# Patient Record
Sex: MALE | Race: WHITE | NOT HISPANIC OR LATINO | Employment: OTHER | URBAN - METROPOLITAN AREA
[De-identification: names, ages, dates, MRNs, and addresses within clinical notes are randomized per-mention and may not be internally consistent; named-entity substitution may affect disease eponyms.]

---

## 2017-01-11 ENCOUNTER — AMBULATORY - HEALTHEAST (OUTPATIENT)
Dept: SURGERY | Facility: CLINIC | Age: 62
End: 2017-01-11

## 2017-01-11 ENCOUNTER — OFFICE VISIT - HEALTHEAST (OUTPATIENT)
Dept: SURGERY | Facility: CLINIC | Age: 62
End: 2017-01-11

## 2017-01-11 DIAGNOSIS — H61.92 SKIN LESION OF LEFT EAR: ICD-10-CM

## 2017-01-11 DIAGNOSIS — L72.9 SCALP CYST: ICD-10-CM

## 2017-01-11 ASSESSMENT — MIFFLIN-ST. JEOR: SCORE: 1760.15

## 2017-01-12 ENCOUNTER — RECORDS - HEALTHEAST (OUTPATIENT)
Dept: ADMINISTRATIVE | Facility: OTHER | Age: 62
End: 2017-01-12

## 2017-01-24 ENCOUNTER — AMBULATORY - HEALTHEAST (OUTPATIENT)
Dept: SURGERY | Facility: CLINIC | Age: 62
End: 2017-01-24

## 2017-01-24 ENCOUNTER — RECORDS - HEALTHEAST (OUTPATIENT)
Dept: ADMINISTRATIVE | Facility: OTHER | Age: 62
End: 2017-01-24

## 2017-02-03 ENCOUNTER — OFFICE VISIT - HEALTHEAST (OUTPATIENT)
Dept: SURGERY | Facility: CLINIC | Age: 62
End: 2017-02-03

## 2017-02-03 DIAGNOSIS — Z98.890 POST-OPERATIVE STATE: ICD-10-CM

## 2017-02-08 ENCOUNTER — COMMUNICATION - HEALTHEAST (OUTPATIENT)
Dept: SURGERY | Facility: CLINIC | Age: 62
End: 2017-02-08

## 2017-03-09 ENCOUNTER — RECORDS - HEALTHEAST (OUTPATIENT)
Dept: ADMINISTRATIVE | Facility: OTHER | Age: 62
End: 2017-03-09

## 2017-03-09 ENCOUNTER — AMBULATORY - HEALTHEAST (OUTPATIENT)
Dept: SURGERY | Facility: CLINIC | Age: 62
End: 2017-03-09

## 2017-03-23 ENCOUNTER — OFFICE VISIT - HEALTHEAST (OUTPATIENT)
Dept: SURGERY | Facility: CLINIC | Age: 62
End: 2017-03-23

## 2017-03-23 DIAGNOSIS — Z48.89 POSTOPERATIVE VISIT: ICD-10-CM

## 2017-11-22 ENCOUNTER — OFFICE VISIT - HEALTHEAST (OUTPATIENT)
Dept: FAMILY MEDICINE | Facility: CLINIC | Age: 62
End: 2017-11-22

## 2017-11-22 DIAGNOSIS — Z23 FLU VACCINE NEED: ICD-10-CM

## 2017-11-22 DIAGNOSIS — Z00.00 ROUTINE ADULT HEALTH MAINTENANCE: ICD-10-CM

## 2017-11-22 LAB
CHOLEST SERPL-MCNC: 185 MG/DL
FASTING STATUS PATIENT QL REPORTED: YES
HDLC SERPL-MCNC: 41 MG/DL
LDLC SERPL CALC-MCNC: 131 MG/DL
PSA SERPL-MCNC: 2.3 NG/ML (ref 0–4.5)
TRIGL SERPL-MCNC: 66 MG/DL

## 2017-11-22 ASSESSMENT — MIFFLIN-ST. JEOR: SCORE: 1714.79

## 2018-11-28 ENCOUNTER — AMBULATORY - HEALTHEAST (OUTPATIENT)
Dept: NURSING | Facility: CLINIC | Age: 63
End: 2018-11-28

## 2019-01-22 ENCOUNTER — RECORDS - HEALTHEAST (OUTPATIENT)
Dept: ADMINISTRATIVE | Facility: OTHER | Age: 64
End: 2019-01-22

## 2019-01-23 ENCOUNTER — RECORDS - HEALTHEAST (OUTPATIENT)
Dept: ADMINISTRATIVE | Facility: OTHER | Age: 64
End: 2019-01-23

## 2019-02-13 ENCOUNTER — OFFICE VISIT - HEALTHEAST (OUTPATIENT)
Dept: FAMILY MEDICINE | Facility: CLINIC | Age: 64
End: 2019-02-13

## 2019-02-13 DIAGNOSIS — R91.1 PULMONARY NODULE: ICD-10-CM

## 2019-02-13 DIAGNOSIS — Z00.00 HEALTH CARE MAINTENANCE: ICD-10-CM

## 2019-02-13 ASSESSMENT — MIFFLIN-ST. JEOR: SCORE: 1633.63

## 2019-02-21 ENCOUNTER — HOSPITAL ENCOUNTER (OUTPATIENT)
Dept: CT IMAGING | Facility: HOSPITAL | Age: 64
Discharge: HOME | End: 2019-02-21
Attending: FAMILY MEDICINE

## 2019-02-21 DIAGNOSIS — R91.1 PULMONARY NODULE: ICD-10-CM

## 2019-02-22 ENCOUNTER — COMMUNICATION - HEALTHEAST (OUTPATIENT)
Dept: FAMILY MEDICINE | Facility: CLINIC | Age: 64
End: 2019-02-22

## 2019-08-29 ENCOUNTER — OFFICE VISIT - HEALTHEAST (OUTPATIENT)
Dept: FAMILY MEDICINE | Facility: CLINIC | Age: 64
End: 2019-08-29

## 2019-08-29 DIAGNOSIS — Z00.00 HEALTH CARE MAINTENANCE: ICD-10-CM

## 2019-08-29 LAB
ALBUMIN SERPL-MCNC: 4.2 G/DL (ref 3.5–5)
ALP SERPL-CCNC: 66 U/L (ref 45–120)
ALT SERPL W P-5'-P-CCNC: 14 U/L (ref 0–45)
ANION GAP SERPL CALCULATED.3IONS-SCNC: 10 MMOL/L (ref 5–18)
AST SERPL W P-5'-P-CCNC: 15 U/L (ref 0–40)
BILIRUB SERPL-MCNC: 1 MG/DL (ref 0–1)
BUN SERPL-MCNC: 25 MG/DL (ref 8–22)
CALCIUM SERPL-MCNC: 9.4 MG/DL (ref 8.5–10.5)
CHLORIDE BLD-SCNC: 106 MMOL/L (ref 98–107)
CHOLEST SERPL-MCNC: 179 MG/DL
CO2 SERPL-SCNC: 25 MMOL/L (ref 22–31)
CREAT SERPL-MCNC: 0.89 MG/DL (ref 0.7–1.3)
ERYTHROCYTE [DISTWIDTH] IN BLOOD BY AUTOMATED COUNT: 11.9 % (ref 11–14.5)
FASTING STATUS PATIENT QL REPORTED: YES
GFR SERPL CREATININE-BSD FRML MDRD: >60 ML/MIN/1.73M2
GLUCOSE BLD-MCNC: 91 MG/DL (ref 70–125)
HCT VFR BLD AUTO: 43.1 % (ref 40–54)
HDLC SERPL-MCNC: 46 MG/DL
HGB BLD-MCNC: 14.7 G/DL (ref 14–18)
LDLC SERPL CALC-MCNC: 119 MG/DL
MCH RBC QN AUTO: 29.8 PG (ref 27–34)
MCHC RBC AUTO-ENTMCNC: 34 G/DL (ref 32–36)
MCV RBC AUTO: 88 FL (ref 80–100)
PLATELET # BLD AUTO: 168 THOU/UL (ref 140–440)
PMV BLD AUTO: 7.7 FL (ref 7–10)
POTASSIUM BLD-SCNC: 4.2 MMOL/L (ref 3.5–5)
PROT SERPL-MCNC: 6.9 G/DL (ref 6–8)
PSA SERPL-MCNC: 2.3 NG/ML (ref 0–4.5)
RBC # BLD AUTO: 4.91 MILL/UL (ref 4.4–6.2)
SODIUM SERPL-SCNC: 141 MMOL/L (ref 136–145)
TRIGL SERPL-MCNC: 68 MG/DL
WBC: 5.5 THOU/UL (ref 4–11)

## 2019-08-29 ASSESSMENT — MIFFLIN-ST. JEOR: SCORE: 1711.16

## 2019-10-25 ENCOUNTER — AMBULATORY - HEALTHEAST (OUTPATIENT)
Dept: NURSING | Facility: CLINIC | Age: 64
End: 2019-10-25

## 2019-10-25 ENCOUNTER — AMBULATORY - HEALTHEAST (OUTPATIENT)
Dept: FAMILY MEDICINE | Facility: CLINIC | Age: 64
End: 2019-10-25

## 2020-03-12 ENCOUNTER — AMBULATORY - HEALTHEAST (OUTPATIENT)
Dept: NURSING | Facility: CLINIC | Age: 65
End: 2020-03-12

## 2020-04-30 ENCOUNTER — OFFICE VISIT - HEALTHEAST (OUTPATIENT)
Dept: FAMILY MEDICINE | Facility: CLINIC | Age: 65
End: 2020-04-30

## 2020-04-30 ENCOUNTER — COMMUNICATION - HEALTHEAST (OUTPATIENT)
Dept: FAMILY MEDICINE | Facility: CLINIC | Age: 65
End: 2020-04-30

## 2020-04-30 DIAGNOSIS — R22.0 MASS OF POSTAURICULAR AREA: ICD-10-CM

## 2020-04-30 DIAGNOSIS — Z12.83 SCREENING EXAM FOR SKIN CANCER: ICD-10-CM

## 2020-05-01 ENCOUNTER — COMMUNICATION - HEALTHEAST (OUTPATIENT)
Dept: FAMILY MEDICINE | Facility: CLINIC | Age: 65
End: 2020-05-01

## 2020-05-05 ENCOUNTER — HOSPITAL ENCOUNTER (OUTPATIENT)
Dept: ULTRASOUND IMAGING | Facility: HOSPITAL | Age: 65
Discharge: HOME | End: 2020-05-05
Attending: FAMILY MEDICINE

## 2020-05-05 DIAGNOSIS — R22.0 MASS OF POSTAURICULAR AREA: ICD-10-CM

## 2020-05-06 ENCOUNTER — COMMUNICATION - HEALTHEAST (OUTPATIENT)
Dept: FAMILY MEDICINE | Facility: CLINIC | Age: 65
End: 2020-05-06

## 2020-05-06 ENCOUNTER — AMBULATORY - HEALTHEAST (OUTPATIENT)
Dept: FAMILY MEDICINE | Facility: CLINIC | Age: 65
End: 2020-05-06

## 2020-05-06 DIAGNOSIS — E21.4: ICD-10-CM

## 2020-05-07 ENCOUNTER — COMMUNICATION - HEALTHEAST (OUTPATIENT)
Dept: FAMILY MEDICINE | Facility: CLINIC | Age: 65
End: 2020-05-07

## 2020-05-12 ENCOUNTER — AMBULATORY - HEALTHEAST (OUTPATIENT)
Dept: LAB | Facility: CLINIC | Age: 65
End: 2020-05-12

## 2020-05-12 ENCOUNTER — HOSPITAL ENCOUNTER (OUTPATIENT)
Dept: ULTRASOUND IMAGING | Facility: CLINIC | Age: 65
Discharge: HOME | End: 2020-05-12
Attending: FAMILY MEDICINE

## 2020-05-12 ENCOUNTER — COMMUNICATION - HEALTHEAST (OUTPATIENT)
Dept: FAMILY MEDICINE | Facility: CLINIC | Age: 65
End: 2020-05-12

## 2020-05-12 DIAGNOSIS — K11.8 PAROTID MASS: ICD-10-CM

## 2020-05-12 DIAGNOSIS — E04.1 THYROID NODULE: ICD-10-CM

## 2020-05-12 DIAGNOSIS — E21.4: ICD-10-CM

## 2020-05-12 LAB
ALBUMIN SERPL-MCNC: 4 G/DL (ref 3.5–5)
ALP SERPL-CCNC: 62 U/L (ref 45–120)
ALT SERPL W P-5'-P-CCNC: 15 U/L (ref 0–45)
ANION GAP SERPL CALCULATED.3IONS-SCNC: 5 MMOL/L (ref 5–18)
AST SERPL W P-5'-P-CCNC: 17 U/L (ref 0–40)
BASOPHILS # BLD AUTO: 0 THOU/UL (ref 0–0.2)
BASOPHILS NFR BLD AUTO: 0 % (ref 0–2)
BILIRUB SERPL-MCNC: 0.6 MG/DL (ref 0–1)
BUN SERPL-MCNC: 22 MG/DL (ref 8–22)
CALCIUM SERPL-MCNC: 9.5 MG/DL (ref 8.5–10.5)
CALCIUM SERPL-MCNC: 9.5 MG/DL (ref 8.5–10.5)
CHLORIDE BLD-SCNC: 105 MMOL/L (ref 98–107)
CO2 SERPL-SCNC: 29 MMOL/L (ref 22–31)
CREAT SERPL-MCNC: 0.85 MG/DL (ref 0.7–1.3)
CREAT SERPL-MCNC: 0.86 MG/DL (ref 0.7–1.3)
EOSINOPHIL # BLD AUTO: 0.1 THOU/UL (ref 0–0.4)
EOSINOPHIL NFR BLD AUTO: 2 % (ref 0–6)
ERYTHROCYTE [DISTWIDTH] IN BLOOD BY AUTOMATED COUNT: 12.4 % (ref 11–14.5)
GFR SERPL CREATININE-BSD FRML MDRD: >60 ML/MIN/1.73M2
GFR SERPL CREATININE-BSD FRML MDRD: >60 ML/MIN/1.73M2
GLUCOSE BLD-MCNC: 94 MG/DL (ref 70–125)
HCT VFR BLD AUTO: 45.2 % (ref 40–54)
HGB BLD-MCNC: 14.4 G/DL (ref 14–18)
LYMPHOCYTES # BLD AUTO: 1.7 THOU/UL (ref 0.8–4.4)
LYMPHOCYTES NFR BLD AUTO: 31 % (ref 20–40)
MCH RBC QN AUTO: 28.8 PG (ref 27–34)
MCHC RBC AUTO-ENTMCNC: 31.9 G/DL (ref 32–36)
MCV RBC AUTO: 90 FL (ref 80–100)
MONOCYTES # BLD AUTO: 0.6 THOU/UL (ref 0–0.9)
MONOCYTES NFR BLD AUTO: 10 % (ref 2–10)
NEUTROPHILS # BLD AUTO: 3.1 THOU/UL (ref 2–7.7)
NEUTROPHILS NFR BLD AUTO: 56 % (ref 50–70)
PHOSPHATE SERPL-MCNC: 3.1 MG/DL (ref 2.5–4.5)
PLATELET # BLD AUTO: 192 THOU/UL (ref 140–440)
PMV BLD AUTO: 9.8 FL (ref 8.5–12.5)
POTASSIUM BLD-SCNC: 4.7 MMOL/L (ref 3.5–5)
PROT SERPL-MCNC: 7.2 G/DL (ref 6–8)
PTH-INTACT SERPL-MCNC: 70 PG/ML (ref 10–86)
RBC # BLD AUTO: 5 MILL/UL (ref 4.4–6.2)
SODIUM SERPL-SCNC: 139 MMOL/L (ref 136–145)
TSH SERPL DL<=0.005 MIU/L-ACNC: 0.82 UIU/ML (ref 0.3–5)
WBC: 5.6 THOU/UL (ref 4–11)

## 2020-05-14 LAB
LAB AP CHARGES (HE HISTORICAL CONVERSION): NORMAL
LAB AP CHARGES (HE HISTORICAL CONVERSION): NORMAL
PATH REPORT.COMMENTS IMP SPEC: NORMAL
PATH REPORT.FINAL DX SPEC: NORMAL
PATH REPORT.FINAL DX SPEC: NORMAL
PATH REPORT.MICROSCOPIC SPEC OTHER STN: NORMAL
PATH REPORT.MICROSCOPIC SPEC OTHER STN: NORMAL
RESULT FLAG (HE HISTORICAL CONVERSION): NORMAL
RESULT FLAG (HE HISTORICAL CONVERSION): NORMAL

## 2020-05-18 ENCOUNTER — COMMUNICATION - HEALTHEAST (OUTPATIENT)
Dept: FAMILY MEDICINE | Facility: CLINIC | Age: 65
End: 2020-05-18

## 2020-05-18 DIAGNOSIS — C85.91 NON-HODGKIN LYMPHOMA OF LYMPH NODES OF NECK, UNSPECIFIED NON-HODGKIN LYMPHOMA TYPE (H): ICD-10-CM

## 2020-05-19 ENCOUNTER — COMMUNICATION - HEALTHEAST (OUTPATIENT)
Dept: ONCOLOGY | Facility: CLINIC | Age: 65
End: 2020-05-19

## 2020-05-22 ENCOUNTER — AMBULATORY - HEALTHEAST (OUTPATIENT)
Dept: INFUSION THERAPY | Facility: HOSPITAL | Age: 65
End: 2020-05-22

## 2020-05-22 ENCOUNTER — OFFICE VISIT - HEALTHEAST (OUTPATIENT)
Dept: ONCOLOGY | Facility: HOSPITAL | Age: 65
End: 2020-05-22

## 2020-05-22 DIAGNOSIS — C85.91 NON-HODGKIN LYMPHOMA OF LYMPH NODES OF NECK, UNSPECIFIED NON-HODGKIN LYMPHOMA TYPE (H): ICD-10-CM

## 2020-05-22 LAB — LDH SERPL L TO P-CCNC: 156 U/L (ref 125–220)

## 2020-05-22 RX ORDER — IBUPROFEN 200 MG
200 TABLET ORAL EVERY 6 HOURS PRN
Status: SHIPPED | COMMUNITY
Start: 2020-05-22 | End: 2023-02-08

## 2020-05-22 RX ORDER — VIT C/HESPERIDIN/BIOFLAVONOIDS 500-100 MG
TABLET ORAL PRN
Status: SHIPPED | COMMUNITY
Start: 2020-05-22

## 2020-05-22 RX ORDER — CETIRIZINE HYDROCHLORIDE 10 MG/1
10 TABLET ORAL DAILY
Status: SHIPPED | COMMUNITY
Start: 2020-05-22 | End: 2021-11-12

## 2020-05-22 ASSESSMENT — MIFFLIN-ST. JEOR: SCORE: 1719.54

## 2020-05-28 ENCOUNTER — HOSPITAL ENCOUNTER (OUTPATIENT)
Dept: PET IMAGING | Facility: HOSPITAL | Age: 65
Setting detail: RADIATION/ONCOLOGY SERIES
Discharge: STILL ADMITTED OR ADMINISTRATIVE | End: 2020-05-28
Attending: INTERNAL MEDICINE

## 2020-05-28 DIAGNOSIS — C85.91 NON-HODGKIN LYMPHOMA OF LYMPH NODES OF NECK, UNSPECIFIED NON-HODGKIN LYMPHOMA TYPE (H): ICD-10-CM

## 2020-05-28 LAB — GLUCOSE BLDC GLUCOMTR-MCNC: 92 MG/DL (ref 70–139)

## 2020-05-28 ASSESSMENT — MIFFLIN-ST. JEOR: SCORE: 1721.59

## 2020-06-01 ENCOUNTER — OFFICE VISIT - HEALTHEAST (OUTPATIENT)
Dept: ONCOLOGY | Facility: HOSPITAL | Age: 65
End: 2020-06-01

## 2020-06-01 DIAGNOSIS — C83.31 DIFFUSE LARGE B-CELL LYMPHOMA OF LYMPH NODES OF NECK (H): ICD-10-CM

## 2020-06-02 ENCOUNTER — COMMUNICATION - HEALTHEAST (OUTPATIENT)
Dept: ONCOLOGY | Facility: HOSPITAL | Age: 65
End: 2020-06-02

## 2020-06-05 ENCOUNTER — OFFICE VISIT - HEALTHEAST (OUTPATIENT)
Dept: FAMILY MEDICINE | Facility: CLINIC | Age: 65
End: 2020-06-05

## 2020-06-05 DIAGNOSIS — M54.50 ACUTE LEFT-SIDED LOW BACK PAIN WITHOUT SCIATICA: ICD-10-CM

## 2020-06-05 LAB
CAP COMMENT: ABNORMAL
LAB AP CHARGES (HE HISTORICAL CONVERSION): ABNORMAL
LAB AP INITIAL CYTO EVAL (HE HISTORICAL CONVERSION): ABNORMAL
LAB MED GENERAL PATH INTERP (HE HISTORICAL CONVERSION): ABNORMAL
PATH REPORT.ADDENDUM SPEC: ABNORMAL
PATH REPORT.COMMENTS IMP SPEC: ABNORMAL
PATH REPORT.COMMENTS IMP SPEC: ABNORMAL
PATH REPORT.FINAL DX SPEC: ABNORMAL
PATH REPORT.MICROSCOPIC SPEC OTHER STN: ABNORMAL
PATH REPORT.MICROSCOPIC SPEC OTHER STN: ABNORMAL
PATH REPORT.RELEVANT HX SPEC: ABNORMAL
SPECIMEN DESCRIPTION: ABNORMAL

## 2020-06-05 NOTE — ASSESSMENT & PLAN NOTE
Acute low back pain of approximately 4 weeks of duration.  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  I suspect that this is actually a muscular injury.  Pain is increasing with time suggesting he does need some therapy.  He has not tried a conservative approach to therapy yet.  - Patient will take 4 and 40 mg of naproxen twice daily x14 days  - We discussed home physical therapy.  I also referred him to formal physical therapy.  - Flexeril as needed  -If not improving in 3-4 weeks I would refer to the spine care clinic urgently.  At that point, the sense of urgency would be based on the eminent chemotherapy.

## 2020-06-08 ENCOUNTER — COMMUNICATION - HEALTHEAST (OUTPATIENT)
Dept: ONCOLOGY | Facility: HOSPITAL | Age: 65
End: 2020-06-08

## 2020-06-09 ENCOUNTER — COMMUNICATION - HEALTHEAST (OUTPATIENT)
Dept: ONCOLOGY | Facility: HOSPITAL | Age: 65
End: 2020-06-09

## 2020-06-16 ENCOUNTER — OFFICE VISIT - HEALTHEAST (OUTPATIENT)
Dept: PHYSICAL THERAPY | Facility: REHABILITATION | Age: 65
End: 2020-06-16

## 2020-06-16 DIAGNOSIS — M54.50 LEFT-SIDED LOW BACK PAIN WITHOUT SCIATICA, UNSPECIFIED CHRONICITY: ICD-10-CM

## 2020-06-19 ENCOUNTER — AMBULATORY - HEALTHEAST (OUTPATIENT)
Dept: ONCOLOGY | Facility: HOSPITAL | Age: 65
End: 2020-06-19

## 2020-06-19 ENCOUNTER — OFFICE VISIT - HEALTHEAST (OUTPATIENT)
Dept: PHYSICAL THERAPY | Facility: REHABILITATION | Age: 65
End: 2020-06-19

## 2020-06-19 DIAGNOSIS — M54.50 LEFT-SIDED LOW BACK PAIN WITHOUT SCIATICA, UNSPECIFIED CHRONICITY: ICD-10-CM

## 2020-06-19 DIAGNOSIS — C82.01 GRADE 1 FOLLICULAR LYMPHOMA OF LYMPH NODES OF NECK (H): ICD-10-CM

## 2020-06-23 ENCOUNTER — OFFICE VISIT - HEALTHEAST (OUTPATIENT)
Dept: PHYSICAL THERAPY | Facility: REHABILITATION | Age: 65
End: 2020-06-23

## 2020-06-23 DIAGNOSIS — M54.50 LEFT-SIDED LOW BACK PAIN WITHOUT SCIATICA, UNSPECIFIED CHRONICITY: ICD-10-CM

## 2020-06-26 ENCOUNTER — OFFICE VISIT - HEALTHEAST (OUTPATIENT)
Dept: PHYSICAL THERAPY | Facility: REHABILITATION | Age: 65
End: 2020-06-26

## 2020-06-26 DIAGNOSIS — M54.50 LEFT-SIDED LOW BACK PAIN WITHOUT SCIATICA, UNSPECIFIED CHRONICITY: ICD-10-CM

## 2020-07-08 ENCOUNTER — OFFICE VISIT - HEALTHEAST (OUTPATIENT)
Dept: OTOLARYNGOLOGY | Facility: CLINIC | Age: 65
End: 2020-07-08

## 2020-07-08 ENCOUNTER — OFFICE VISIT - HEALTHEAST (OUTPATIENT)
Dept: PHYSICAL THERAPY | Facility: REHABILITATION | Age: 65
End: 2020-07-08

## 2020-07-08 DIAGNOSIS — C85.91: ICD-10-CM

## 2020-07-08 DIAGNOSIS — M54.50 LEFT-SIDED LOW BACK PAIN WITHOUT SCIATICA, UNSPECIFIED CHRONICITY: ICD-10-CM

## 2020-07-09 ENCOUNTER — COMMUNICATION - HEALTHEAST (OUTPATIENT)
Dept: OTOLARYNGOLOGY | Facility: CLINIC | Age: 65
End: 2020-07-09

## 2020-07-09 ENCOUNTER — COMMUNICATION - HEALTHEAST (OUTPATIENT)
Dept: FAMILY MEDICINE | Facility: CLINIC | Age: 65
End: 2020-07-09

## 2020-07-09 ENCOUNTER — AMBULATORY - HEALTHEAST (OUTPATIENT)
Dept: OTOLARYNGOLOGY | Facility: CLINIC | Age: 65
End: 2020-07-09

## 2020-07-09 DIAGNOSIS — Z11.59 ENCOUNTER FOR SCREENING FOR OTHER VIRAL DISEASES: ICD-10-CM

## 2020-07-15 ENCOUNTER — OFFICE VISIT - HEALTHEAST (OUTPATIENT)
Dept: FAMILY MEDICINE | Facility: CLINIC | Age: 65
End: 2020-07-15

## 2020-07-15 DIAGNOSIS — Z01.818 PREOP GENERAL PHYSICAL EXAM: ICD-10-CM

## 2020-07-15 DIAGNOSIS — C85.91: ICD-10-CM

## 2020-07-15 ASSESSMENT — MIFFLIN-ST. JEOR: SCORE: 1720.68

## 2020-07-20 ENCOUNTER — RECORDS - HEALTHEAST (OUTPATIENT)
Dept: ADMINISTRATIVE | Facility: OTHER | Age: 65
End: 2020-07-20

## 2020-07-24 ENCOUNTER — AMBULATORY - HEALTHEAST (OUTPATIENT)
Dept: FAMILY MEDICINE | Facility: CLINIC | Age: 65
End: 2020-07-24

## 2020-07-24 DIAGNOSIS — Z11.59 ENCOUNTER FOR SCREENING FOR OTHER VIRAL DISEASES: ICD-10-CM

## 2020-07-27 ENCOUNTER — ANESTHESIA - HEALTHEAST (OUTPATIENT)
Dept: SURGERY | Facility: AMBULATORY SURGERY CENTER | Age: 65
End: 2020-07-27

## 2020-07-27 ENCOUNTER — COMMUNICATION - HEALTHEAST (OUTPATIENT)
Dept: OTOLARYNGOLOGY | Facility: CLINIC | Age: 65
End: 2020-07-27

## 2020-07-27 ASSESSMENT — MIFFLIN-ST. JEOR
SCORE: 1712.51
SCORE: 1712.51

## 2020-07-28 ENCOUNTER — SURGERY - HEALTHEAST (OUTPATIENT)
Dept: SURGERY | Facility: AMBULATORY SURGERY CENTER | Age: 65
End: 2020-07-28

## 2020-07-28 ENCOUNTER — RECORDS - HEALTHEAST (OUTPATIENT)
Dept: ADMINISTRATIVE | Facility: OTHER | Age: 65
End: 2020-07-28

## 2020-07-28 ENCOUNTER — HOSPITAL ENCOUNTER (OUTPATIENT)
Dept: SURGERY | Facility: AMBULATORY SURGERY CENTER | Age: 65
Discharge: HOME | End: 2020-07-28
Attending: OTOLARYNGOLOGY | Admitting: OTOLARYNGOLOGY
Payer: MEDICARE

## 2020-07-28 DIAGNOSIS — C85.91: ICD-10-CM

## 2020-07-28 ASSESSMENT — MIFFLIN-ST. JEOR
SCORE: 1712.51
SCORE: 1712.51

## 2020-08-03 ENCOUNTER — COMMUNICATION - HEALTHEAST (OUTPATIENT)
Dept: ONCOLOGY | Facility: HOSPITAL | Age: 65
End: 2020-08-03

## 2020-08-04 ENCOUNTER — RECORDS - HEALTHEAST (OUTPATIENT)
Dept: ADMINISTRATIVE | Facility: OTHER | Age: 65
End: 2020-08-04

## 2020-08-04 LAB
LAB AP CHARGES (HE HISTORICAL CONVERSION): ABNORMAL
PATH REPORT.COMMENTS IMP SPEC: ABNORMAL
PATH REPORT.COMMENTS IMP SPEC: ABNORMAL
PATH REPORT.FINAL DX SPEC: ABNORMAL
PATH REPORT.MICROSCOPIC SPEC OTHER STN: ABNORMAL
RESULT FLAG (HE HISTORICAL CONVERSION): ABNORMAL

## 2020-08-06 ENCOUNTER — COMMUNICATION - HEALTHEAST (OUTPATIENT)
Dept: OTOLARYNGOLOGY | Facility: CLINIC | Age: 65
End: 2020-08-06

## 2020-08-07 ENCOUNTER — OFFICE VISIT - HEALTHEAST (OUTPATIENT)
Dept: ONCOLOGY | Facility: HOSPITAL | Age: 65
End: 2020-08-07

## 2020-08-07 DIAGNOSIS — C82.31 GRADE 3A FOLLICULAR LYMPHOMA OF LYMPH NODES OF NECK (H): ICD-10-CM

## 2020-08-07 DIAGNOSIS — C88.40 EXTRANODAL MARGINAL ZONE LYMPHOMA OF MUCOSA-ASSOCIATED LYMPHOID TISSUE OF STOMACH: ICD-10-CM

## 2020-08-07 DIAGNOSIS — C85.91: ICD-10-CM

## 2020-08-10 ENCOUNTER — AMBULATORY - HEALTHEAST (OUTPATIENT)
Dept: LAB | Facility: HOSPITAL | Age: 65
End: 2020-08-10

## 2020-08-10 ENCOUNTER — AMBULATORY - HEALTHEAST (OUTPATIENT)
Dept: ONCOLOGY | Facility: HOSPITAL | Age: 65
End: 2020-08-10

## 2020-08-10 ENCOUNTER — INFUSION - HEALTHEAST (OUTPATIENT)
Dept: INFUSION THERAPY | Facility: HOSPITAL | Age: 65
End: 2020-08-10

## 2020-08-10 DIAGNOSIS — C85.91: ICD-10-CM

## 2020-08-10 LAB
BASOPHILS # BLD AUTO: 0 THOU/UL (ref 0–0.2)
BASOPHILS NFR BLD AUTO: 0 % (ref 0–2)
EOSINOPHIL # BLD AUTO: 0.1 THOU/UL (ref 0–0.4)
EOSINOPHIL NFR BLD AUTO: 2 % (ref 0–6)
ERYTHROCYTE [DISTWIDTH] IN BLOOD BY AUTOMATED COUNT: 12.7 % (ref 11–14.5)
HCT VFR BLD AUTO: 42.3 % (ref 40–54)
HGB BLD-MCNC: 13.7 G/DL (ref 14–18)
LYMPHOCYTES # BLD AUTO: 1.5 THOU/UL (ref 0.8–4.4)
LYMPHOCYTES NFR BLD AUTO: 28 % (ref 20–40)
MCH RBC QN AUTO: 29 PG (ref 27–34)
MCHC RBC AUTO-ENTMCNC: 32.4 G/DL (ref 32–36)
MCV RBC AUTO: 90 FL (ref 80–100)
MONOCYTES # BLD AUTO: 0.6 THOU/UL (ref 0–0.9)
MONOCYTES NFR BLD AUTO: 11 % (ref 2–10)
NEUTROPHILS # BLD AUTO: 3.2 THOU/UL (ref 2–7.7)
NEUTROPHILS NFR BLD AUTO: 59 % (ref 50–70)
PLATELET # BLD AUTO: 184 THOU/UL (ref 140–440)
PMV BLD AUTO: 9.9 FL (ref 8.5–12.5)
RBC # BLD AUTO: 4.72 MILL/UL (ref 4.4–6.2)
WBC: 5.5 THOU/UL (ref 4–11)

## 2020-08-12 LAB
LAB AP CHARGES (HE HISTORICAL CONVERSION): NORMAL
PATH REPORT.COMMENTS IMP SPEC: NORMAL
PATH REPORT.FINAL DX SPEC: NORMAL
PATH REPORT.MICROSCOPIC SPEC OTHER STN: NORMAL
RESULT FLAG (HE HISTORICAL CONVERSION): NORMAL

## 2020-08-13 ENCOUNTER — HOSPITAL ENCOUNTER (OUTPATIENT)
Dept: PET IMAGING | Facility: HOSPITAL | Age: 65
Setting detail: RADIATION/ONCOLOGY SERIES
Discharge: STILL ADMITTED OR ADMINISTRATIVE | End: 2020-08-13
Attending: INTERNAL MEDICINE

## 2020-08-13 DIAGNOSIS — C85.91: ICD-10-CM

## 2020-08-13 LAB — GLUCOSE BLDC GLUCOMTR-MCNC: 93 MG/DL (ref 70–139)

## 2020-08-13 ASSESSMENT — MIFFLIN-ST. JEOR: SCORE: 1717.05

## 2020-08-17 ENCOUNTER — OFFICE VISIT - HEALTHEAST (OUTPATIENT)
Dept: ONCOLOGY | Facility: HOSPITAL | Age: 65
End: 2020-08-17

## 2020-08-17 ENCOUNTER — RECORDS - HEALTHEAST (OUTPATIENT)
Dept: ADMINISTRATIVE | Facility: OTHER | Age: 65
End: 2020-08-17

## 2020-08-17 DIAGNOSIS — C85.91: ICD-10-CM

## 2020-08-19 LAB
BKR LAB AP CORE BIOPSY/ASPIRATE CLOT: NORMAL
LAB AP ASPIRATE SMEAR (HE HISTORICAL CONVERSION): NORMAL
LAB AP BONE MARROW DIFF (HE HISTORICAL CONVERSION): NORMAL
LAB AP CHARGES (HE HISTORICAL CONVERSION): NORMAL
PATH REPORT.COMMENTS IMP SPEC: NORMAL
PATH REPORT.FINAL DX SPEC: NORMAL
PATH REPORT.MICROSCOPIC SPEC OTHER STN: NORMAL
PATH REPORT.MICROSCOPIC SPEC OTHER STN: NORMAL
PATH REPORT.RELEVANT HX SPEC: NORMAL
RESULT FLAG (HE HISTORICAL CONVERSION): NORMAL

## 2020-08-20 ENCOUNTER — OFFICE VISIT - HEALTHEAST (OUTPATIENT)
Dept: RADIATION ONCOLOGY | Facility: HOSPITAL | Age: 65
End: 2020-08-20

## 2020-08-20 ENCOUNTER — AMBULATORY - HEALTHEAST (OUTPATIENT)
Dept: RADIATION ONCOLOGY | Facility: HOSPITAL | Age: 65
End: 2020-08-20

## 2020-08-20 DIAGNOSIS — C85.91: ICD-10-CM

## 2020-08-21 ENCOUNTER — AMBULATORY - HEALTHEAST (OUTPATIENT)
Dept: RADIATION ONCOLOGY | Facility: HOSPITAL | Age: 65
End: 2020-08-21

## 2020-08-21 DIAGNOSIS — C85.91: ICD-10-CM

## 2020-08-25 ENCOUNTER — AMBULATORY - HEALTHEAST (OUTPATIENT)
Dept: ONCOLOGY | Facility: HOSPITAL | Age: 65
End: 2020-08-25

## 2020-08-25 LAB — SPECIMEN STATUS: NORMAL

## 2020-08-26 ENCOUNTER — AMBULATORY - HEALTHEAST (OUTPATIENT)
Dept: RADIATION ONCOLOGY | Facility: HOSPITAL | Age: 65
End: 2020-08-26

## 2020-08-28 ENCOUNTER — AMBULATORY - HEALTHEAST (OUTPATIENT)
Dept: LAB | Facility: HOSPITAL | Age: 65
End: 2020-08-28

## 2020-08-31 ENCOUNTER — AMBULATORY - HEALTHEAST (OUTPATIENT)
Dept: FAMILY MEDICINE | Facility: CLINIC | Age: 65
End: 2020-08-31

## 2020-08-31 DIAGNOSIS — C85.91: ICD-10-CM

## 2020-09-02 ENCOUNTER — COMMUNICATION - HEALTHEAST (OUTPATIENT)
Dept: SCHEDULING | Facility: CLINIC | Age: 65
End: 2020-09-02

## 2020-09-03 ENCOUNTER — AMBULATORY - HEALTHEAST (OUTPATIENT)
Dept: RADIATION ONCOLOGY | Facility: HOSPITAL | Age: 65
End: 2020-09-03

## 2020-09-10 ENCOUNTER — COMMUNICATION - HEALTHEAST (OUTPATIENT)
Dept: ONCOLOGY | Facility: HOSPITAL | Age: 65
End: 2020-09-10

## 2020-09-10 ENCOUNTER — RECORDS - HEALTHEAST (OUTPATIENT)
Dept: ADMINISTRATIVE | Facility: OTHER | Age: 65
End: 2020-09-10

## 2020-09-24 ENCOUNTER — COMMUNICATION - HEALTHEAST (OUTPATIENT)
Dept: ONCOLOGY | Facility: HOSPITAL | Age: 65
End: 2020-09-24

## 2020-09-30 ENCOUNTER — RECORDS - HEALTHEAST (OUTPATIENT)
Dept: ADMINISTRATIVE | Facility: OTHER | Age: 65
End: 2020-09-30

## 2020-10-02 ENCOUNTER — AMBULATORY - HEALTHEAST (OUTPATIENT)
Dept: ONCOLOGY | Facility: HOSPITAL | Age: 65
End: 2020-10-02

## 2020-10-13 ENCOUNTER — AMBULATORY - HEALTHEAST (OUTPATIENT)
Dept: ONCOLOGY | Facility: HOSPITAL | Age: 65
End: 2020-10-13

## 2020-10-22 ENCOUNTER — COMMUNICATION - HEALTHEAST (OUTPATIENT)
Dept: ONCOLOGY | Facility: HOSPITAL | Age: 65
End: 2020-10-22

## 2020-10-24 ENCOUNTER — AMBULATORY - HEALTHEAST (OUTPATIENT)
Dept: NURSING | Facility: CLINIC | Age: 65
End: 2020-10-24

## 2020-12-03 ENCOUNTER — OFFICE VISIT - HEALTHEAST (OUTPATIENT)
Dept: ONCOLOGY | Facility: HOSPITAL | Age: 65
End: 2020-12-03

## 2020-12-03 DIAGNOSIS — C85.91: ICD-10-CM

## 2020-12-18 ENCOUNTER — OFFICE VISIT - HEALTHEAST (OUTPATIENT)
Dept: FAMILY MEDICINE | Facility: CLINIC | Age: 65
End: 2020-12-18

## 2020-12-18 DIAGNOSIS — C85.91: ICD-10-CM

## 2020-12-18 DIAGNOSIS — C43.9 MELANOMA OF SKIN (H): ICD-10-CM

## 2020-12-18 DIAGNOSIS — Z00.00 ROUTINE HISTORY AND PHYSICAL EXAMINATION OF ADULT: ICD-10-CM

## 2020-12-18 LAB
ALBUMIN SERPL-MCNC: 4.3 G/DL (ref 3.5–5)
ALP SERPL-CCNC: 67 U/L (ref 45–120)
ALT SERPL W P-5'-P-CCNC: 12 U/L (ref 0–45)
ANION GAP SERPL CALCULATED.3IONS-SCNC: 11 MMOL/L (ref 5–18)
AST SERPL W P-5'-P-CCNC: 13 U/L (ref 0–40)
BILIRUB SERPL-MCNC: 1 MG/DL (ref 0–1)
BUN SERPL-MCNC: 26 MG/DL (ref 8–22)
CALCIUM SERPL-MCNC: 9.3 MG/DL (ref 8.5–10.5)
CHLORIDE BLD-SCNC: 106 MMOL/L (ref 98–107)
CHOLEST SERPL-MCNC: 192 MG/DL
CO2 SERPL-SCNC: 25 MMOL/L (ref 22–31)
CREAT SERPL-MCNC: 0.81 MG/DL (ref 0.7–1.3)
ERYTHROCYTE [DISTWIDTH] IN BLOOD BY AUTOMATED COUNT: 12.2 % (ref 11–14.5)
FASTING STATUS PATIENT QL REPORTED: YES
GFR SERPL CREATININE-BSD FRML MDRD: >60 ML/MIN/1.73M2
GLUCOSE BLD-MCNC: 93 MG/DL (ref 70–125)
HCT VFR BLD AUTO: 42.9 % (ref 40–54)
HDLC SERPL-MCNC: 46 MG/DL
HGB BLD-MCNC: 14.7 G/DL (ref 14–18)
LDLC SERPL CALC-MCNC: 128 MG/DL
MCH RBC QN AUTO: 29.7 PG (ref 27–34)
MCHC RBC AUTO-ENTMCNC: 34.2 G/DL (ref 32–36)
MCV RBC AUTO: 87 FL (ref 80–100)
PLATELET # BLD AUTO: 165 THOU/UL (ref 140–440)
PMV BLD AUTO: 7.4 FL (ref 7–10)
POTASSIUM BLD-SCNC: 4.4 MMOL/L (ref 3.5–5)
PROT SERPL-MCNC: 6.9 G/DL (ref 6–8)
PSA SERPL-MCNC: 3.4 NG/ML (ref 0–4.5)
RBC # BLD AUTO: 4.95 MILL/UL (ref 4.4–6.2)
SODIUM SERPL-SCNC: 142 MMOL/L (ref 136–145)
TRIGL SERPL-MCNC: 89 MG/DL
WBC: 5 THOU/UL (ref 4–11)

## 2020-12-18 ASSESSMENT — MIFFLIN-ST. JEOR: SCORE: 1707.19

## 2020-12-24 ENCOUNTER — AMBULATORY - HEALTHEAST (OUTPATIENT)
Dept: FAMILY MEDICINE | Facility: CLINIC | Age: 65
End: 2020-12-24

## 2021-01-21 ENCOUNTER — COMMUNICATION - HEALTHEAST (OUTPATIENT)
Dept: ONCOLOGY | Facility: HOSPITAL | Age: 66
End: 2021-01-21

## 2021-01-22 ENCOUNTER — COMMUNICATION - HEALTHEAST (OUTPATIENT)
Dept: FAMILY MEDICINE | Facility: CLINIC | Age: 66
End: 2021-01-22

## 2021-01-22 ENCOUNTER — AMBULATORY - HEALTHEAST (OUTPATIENT)
Dept: FAMILY MEDICINE | Facility: CLINIC | Age: 66
End: 2021-01-22

## 2021-01-22 DIAGNOSIS — Z12.5 SCREENING FOR PROSTATE CANCER: ICD-10-CM

## 2021-01-27 ENCOUNTER — AMBULATORY - HEALTHEAST (OUTPATIENT)
Dept: LAB | Facility: CLINIC | Age: 66
End: 2021-01-27

## 2021-01-27 DIAGNOSIS — Z12.5 SCREENING FOR PROSTATE CANCER: ICD-10-CM

## 2021-01-27 LAB — PSA SERPL-MCNC: 3.7 NG/ML (ref 0–4.5)

## 2021-01-28 ENCOUNTER — COMMUNICATION - HEALTHEAST (OUTPATIENT)
Dept: FAMILY MEDICINE | Facility: CLINIC | Age: 66
End: 2021-01-28

## 2021-01-28 DIAGNOSIS — Z12.5 SCREENING FOR PROSTATE CANCER: ICD-10-CM

## 2021-03-01 ENCOUNTER — COMMUNICATION - HEALTHEAST (OUTPATIENT)
Dept: FAMILY MEDICINE | Facility: CLINIC | Age: 66
End: 2021-03-01

## 2021-03-02 ENCOUNTER — OFFICE VISIT - HEALTHEAST (OUTPATIENT)
Dept: ONCOLOGY | Facility: HOSPITAL | Age: 66
End: 2021-03-02

## 2021-03-02 ENCOUNTER — AMBULATORY - HEALTHEAST (OUTPATIENT)
Dept: LAB | Facility: CLINIC | Age: 66
End: 2021-03-02

## 2021-03-02 DIAGNOSIS — Z12.5 SCREENING FOR PROSTATE CANCER: ICD-10-CM

## 2021-03-02 DIAGNOSIS — K11.8 PAROTID MASS: ICD-10-CM

## 2021-03-02 LAB — PSA SERPL-MCNC: 2.7 NG/ML (ref 0–4.5)

## 2021-04-05 ENCOUNTER — AMBULATORY - HEALTHEAST (OUTPATIENT)
Dept: NURSING | Facility: CLINIC | Age: 66
End: 2021-04-05

## 2021-04-26 ENCOUNTER — AMBULATORY - HEALTHEAST (OUTPATIENT)
Dept: NURSING | Facility: CLINIC | Age: 66
End: 2021-04-26

## 2021-05-24 ENCOUNTER — RECORDS - HEALTHEAST (OUTPATIENT)
Dept: ADMINISTRATIVE | Facility: CLINIC | Age: 66
End: 2021-05-24

## 2021-05-27 VITALS
OXYGEN SATURATION: 96 % | DIASTOLIC BLOOD PRESSURE: 76 MMHG | SYSTOLIC BLOOD PRESSURE: 125 MMHG | HEART RATE: 63 BPM | TEMPERATURE: 98.3 F | RESPIRATION RATE: 16 BRPM

## 2021-05-27 VITALS
OXYGEN SATURATION: 98 % | HEART RATE: 70 BPM | DIASTOLIC BLOOD PRESSURE: 72 MMHG | TEMPERATURE: 98.2 F | SYSTOLIC BLOOD PRESSURE: 119 MMHG

## 2021-05-30 VITALS — WEIGHT: 209 LBS | HEIGHT: 71 IN | BODY MASS INDEX: 29.26 KG/M2

## 2021-05-31 VITALS — BODY MASS INDEX: 27.86 KG/M2 | WEIGHT: 199 LBS | HEIGHT: 71 IN

## 2021-05-31 NOTE — PROGRESS NOTES
Assessment:      Healthy male exam.    [facility]  Plan:       All questions answered.    GMX-qi-[id number]-date with immunizations, up-to-date with colon cancer screening, will obtain fasting blood work today and follow-up based on results  Subjective:      Dejan Zarco is a 64 y.o. male who presents for an annual exam. The patient reports that there is not domestic violence in his life.  Patient is here for annual exam.  He has no acute concerns.  He is up-to-date on healthcare maintenance.  Patient exercises on a regular basis in the form of biking during the summer and cross-country skiing in the winter.  He is retired .  He has been spending time reading and enjoying his time off with exercising.  He has noted some more joint aches after exercising but nothing of large concern.    Healthy Habits:   Regular Exercise: Yes  Sunscreen Use: Yes  Healthy Diet: Yes  Dental Visits Regularly: Yes  Seat Belt: Yes  Sexually active: Yes  Colonoscopy: Yes  Lipid Profile: Yes  Glucose Screen: Yes        Immunization History   Administered Date(s) Administered     INFLUENZA,RECOMBINANT,INJ,PF QUADRIVALENT 18+YRS 11/28/2018     Influenza, inj, historic,unspecified 10/20/2016     Influenza,seasonal,quad inj =/> 6months 10/20/2016, 11/22/2017     Td, adult adsorbed, PF 02/13/2019     ZOSTER, LIVE 10/20/2016     Immunization status: up to date and documented.    No exam data present    Current Outpatient Medications   Medication Sig Dispense Refill     aspirin 81 MG EC tablet Take 81 mg by mouth daily.       UNABLE TO FIND Med Name:Allergy medications over the counter       No current facility-administered medications for this visit.      No past medical history on file.  Past Surgical History:   Procedure Laterality Date     CYST REMOVAL      Scalp     Patient has no known allergies.  Family History   Problem Relation Age of Onset     Ovarian cancer Mother      Heart disease Father      Stroke Father      No  Medical Problems Sister      Heart disease Brother      Diabetes Brother      No Medical Problems Brother      Social History     Socioeconomic History     Marital status:      Spouse name: Not on file     Number of children: Not on file     Years of education: Not on file     Highest education level: Not on file   Occupational History     Not on file   Social Needs     Financial resource strain: Not on file     Food insecurity:     Worry: Not on file     Inability: Not on file     Transportation needs:     Medical: Not on file     Non-medical: Not on file   Tobacco Use     Smoking status: Never Smoker     Smokeless tobacco: Never Used   Substance and Sexual Activity     Alcohol use: Yes     Drug use: No     Sexual activity: Not on file   Lifestyle     Physical activity:     Days per week: Not on file     Minutes per session: Not on file     Stress: Not on file   Relationships     Social connections:     Talks on phone: Not on file     Gets together: Not on file     Attends Yarsani service: Not on file     Active member of club or organization: Not on file     Attends meetings of clubs or organizations: Not on file     Relationship status: Not on file     Intimate partner violence:     Fear of current or ex partner: Not on file     Emotionally abused: Not on file     Physically abused: Not on file     Forced sexual activity: Not on file   Other Topics Concern     Not on file   Social History Narrative     Not on file       Review of Systems  General:  Denies problem  Eyes: Denies problem  Ears/Nose/Throat: Denies problem  Cardiovascular: Denies problem  Respiratory:  Denies problem  Gastrointestinal:  Denies problem  Genitourinary: Denies problem  Musculoskeletal:  Denies problem  Skin: Denies problem  Neurologic: Denies problem  Psychiatric: Denies problem  Endocrine: Denies problem  Heme/Lymphatic: Denies problem   Allergic/Immunologic: Denies problem        Objective:     Vitals:    08/29/19 0753   BP:  "129/72   Pulse: 69   Resp: 16   Temp: (!) 95.4  F (35.2  C)   TempSrc: Oral   Weight: 198 lb 3.2 oz (89.9 kg)   Height: 5' 11\" (1.803 m)     Body mass index is 27.64 kg/m .    Physical  General Appearance: Alert, cooperative, no distress, appears stated age  Head: Normocephalic, without obvious abnormality, atraumatic  Eyes: PERRL, conjunctiva/corneas clear, EOM's intact  Ears: Normal TM's and external ear canals, both ears  Nose: Nares normal, septum midline,mucosa normal, no drainage  Throat: Lips, mucosa, and tongue normal; teeth and gums normal  Neck: Supple, symmetrical, trachea midline, no adenopathy;  thyroid: not enlarged, symmetric, no tenderness/mass/nodules; no carotid bruit or JVD  Back: Symmetric, no curvature, ROM normal, no CVA tenderness  Lungs: Clear to auscultation bilaterally, respirations unlabored  Heart: Regular rate and rhythm, S1 and S2 normal, no murmur, rub, or gallop,  Abdomen: Soft, non-tender, bowel sounds active all four quadrants,  no masses, no organomegaly  Musculoskeletal: Normal range of motion. No joint swelling or deformity.   Extremities: Extremities normal, atraumatic, no cyanosis or edema  Skin: Skin color, texture, turgor normal, no rashes or lesions, multiple cherry hemangiomas and seb keratosis  Lymph nodes: Cervical, supraclavicular, and axillary nodes normal  Neurologic: He is alert. He has normal reflexes.   Psychiatric: He has a normal mood and affect.            "

## 2021-06-02 ENCOUNTER — HOSPITAL ENCOUNTER (OUTPATIENT)
Dept: ULTRASOUND IMAGING | Facility: HOSPITAL | Age: 66
Discharge: HOME | End: 2021-06-02
Attending: FAMILY MEDICINE

## 2021-06-02 VITALS — HEIGHT: 71 IN | BODY MASS INDEX: 25.23 KG/M2 | WEIGHT: 180.2 LBS

## 2021-06-02 DIAGNOSIS — E04.1 THYROID NODULE: ICD-10-CM

## 2021-06-03 VITALS — WEIGHT: 198.2 LBS | BODY MASS INDEX: 27.75 KG/M2 | HEIGHT: 71 IN

## 2021-06-04 VITALS
BODY MASS INDEX: 26.86 KG/M2 | HEART RATE: 84 BPM | HEIGHT: 72 IN | TEMPERATURE: 98.8 F | SYSTOLIC BLOOD PRESSURE: 147 MMHG | OXYGEN SATURATION: 98 % | DIASTOLIC BLOOD PRESSURE: 77 MMHG | WEIGHT: 198.3 LBS

## 2021-06-04 VITALS
HEIGHT: 72 IN | OXYGEN SATURATION: 95 % | BODY MASS INDEX: 26.66 KG/M2 | SYSTOLIC BLOOD PRESSURE: 132 MMHG | DIASTOLIC BLOOD PRESSURE: 80 MMHG | HEART RATE: 103 BPM | WEIGHT: 196.8 LBS

## 2021-06-04 VITALS
DIASTOLIC BLOOD PRESSURE: 86 MMHG | WEIGHT: 194.8 LBS | TEMPERATURE: 98.1 F | HEART RATE: 80 BPM | BODY MASS INDEX: 26.42 KG/M2 | OXYGEN SATURATION: 96 % | SYSTOLIC BLOOD PRESSURE: 141 MMHG

## 2021-06-04 VITALS
HEART RATE: 73 BPM | WEIGHT: 194.5 LBS | DIASTOLIC BLOOD PRESSURE: 72 MMHG | OXYGEN SATURATION: 97 % | TEMPERATURE: 97.8 F | BODY MASS INDEX: 26.38 KG/M2 | SYSTOLIC BLOOD PRESSURE: 140 MMHG

## 2021-06-04 VITALS
TEMPERATURE: 98.1 F | DIASTOLIC BLOOD PRESSURE: 80 MMHG | OXYGEN SATURATION: 98 % | WEIGHT: 193 LBS | RESPIRATION RATE: 16 BRPM | HEART RATE: 76 BPM | SYSTOLIC BLOOD PRESSURE: 146 MMHG | BODY MASS INDEX: 26.18 KG/M2

## 2021-06-04 VITALS
WEIGHT: 195 LBS | HEIGHT: 72 IN | HEIGHT: 72 IN | BODY MASS INDEX: 26.41 KG/M2 | WEIGHT: 195 LBS | BODY MASS INDEX: 26.41 KG/M2

## 2021-06-04 VITALS — WEIGHT: 196 LBS | HEIGHT: 72 IN | BODY MASS INDEX: 26.55 KG/M2

## 2021-06-04 VITALS — WEIGHT: 195 LBS | BODY MASS INDEX: 27.2 KG/M2

## 2021-06-04 VITALS — BODY MASS INDEX: 26.7 KG/M2 | WEIGHT: 196.9 LBS

## 2021-06-04 VITALS — HEIGHT: 72 IN | WEIGHT: 197 LBS | BODY MASS INDEX: 26.68 KG/M2

## 2021-06-05 ENCOUNTER — HEALTH MAINTENANCE LETTER (OUTPATIENT)
Age: 66
End: 2021-06-05

## 2021-06-05 VITALS
SYSTOLIC BLOOD PRESSURE: 135 MMHG | WEIGHT: 198.2 LBS | TEMPERATURE: 97.6 F | DIASTOLIC BLOOD PRESSURE: 73 MMHG | RESPIRATION RATE: 18 BRPM | BODY MASS INDEX: 27.75 KG/M2 | HEIGHT: 71 IN | HEART RATE: 65 BPM

## 2021-06-05 VITALS
SYSTOLIC BLOOD PRESSURE: 145 MMHG | TEMPERATURE: 97.7 F | DIASTOLIC BLOOD PRESSURE: 73 MMHG | OXYGEN SATURATION: 99 % | WEIGHT: 200.8 LBS | BODY MASS INDEX: 27.23 KG/M2 | HEART RATE: 76 BPM

## 2021-06-07 ENCOUNTER — OFFICE VISIT - HEALTHEAST (OUTPATIENT)
Dept: ONCOLOGY | Facility: HOSPITAL | Age: 66
End: 2021-06-07

## 2021-06-07 DIAGNOSIS — K11.8 PAROTID MASS: ICD-10-CM

## 2021-06-07 NOTE — PROGRESS NOTES
"Dejan Zarco is a 64 y.o. male who is being evaluated via a billable video visit.      The patient has been notified of following:     \"This video visit will be conducted via a call between you and your physician/provider. We have found that certain health care needs can be provided without the need for an in-person physical exam.  This service lets us provide the care you need with a video conversation.  If a prescription is necessary we can send it directly to your pharmacy.  If lab work is needed we can place an order for that and you can then stop by our lab to have the test done at a later time.    Video visits are billed at different rates depending on your insurance coverage. Please reach out to your insurance provider with any questions.    If during the course of the call the physician/provider feels a video visit is not appropriate, you will not be charged for this service.\"    Patient has given verbal consent to a Video visit? Yes    Patient would like to receive their AVS by AVS Preference: Taylor.    Patient would like the video invitation sent by: Text to cell phone: [phone]    Will anyone else be joining your video visit? No        Video Start Time: 1:44 PM   Video-Visit Details    Type of service:  Video Visit    Video End Time (time video stopped):  2:00pm   Originating Location (pt. Location): Home    Distant Location (provider location):  [facility]     Platform used for Video Visit: [facility]       CHIEF COMPLAINT:  Chief Complaint   Patient presents with     Mass     Lump under jaw on right side.  First noticed in Feb.  Getting larger with time.  Now between the size of a kathie.  No pain.  Not rock hard.  No fevers, chills, no weight lost, no body aches.         HISTORY OF PRESENT ILLNESS:  Dejan Zarco is a 64 y.o. male with no pertinent medical history was seen in video consultation with his wife because he is noticed a lump under his right ear " for the last month or 2 since the end of February.  He notes that it is right behind his right ear and is approximately a centimeter but is definitely more noticeable so he thinks it is getting larger with time.  He indicates he notices it when he shaves.  It is not hard but also not soft and mobile like his previous pilar cysts were that were removed off the top of his scalp.  He indicates that he has had no other recent systemic symptoms that correlate including no hearing changes, nausea fatigue dizziness headaches, difficulty swallowing, chest pain shortness of breath etc.  He indicates that it feels fixed and nonmobile.  No previous history of cancer.  He did have   Review of records indicates that Dr. Leon in 2017 and excised a dysplastic lesion behind his left ear but when I found his pathology report it does not appear to have been cancerous.  He indicates he has been balding since he was 21 and previously would not wear a hat and washing but now does.  He indicates he may be has felt a spot on his upper scalp as well recently that should be looked at.  Has not seen a dermatologist in over 10 years.  Otherwise no health conditions.  He is an avid bicyclist.  REVIEW OF SYSTEMS:    Comprehensive review of systems is negative except as noted in the HPI.     PFSH:  Tobacco use and medications reviewed below.   No previous history of smoking    TOBACCO USE:  Social History     Tobacco Use   Smoking Status Never Smoker   Smokeless Tobacco Never Used       VITALS:  Vitals:    04/30/20 1323   Weight: 195 lb (88.5 kg)     Wt Readings from Last 3 Encounters:   04/30/20 195 lb (88.5 kg)   08/29/19 198 lb 3.2 oz (89.9 kg)   02/13/19 180 lb 3.2 oz (81.7 kg)       PHYSICAL EXAM:  Constitutional:  Reveals a 64 y.o. male in NAD.  Vitals:  Per nursing notes.  Neck: 1 cm fixed mass posterior auricular on the right side.  Denies any lymphadenopathy elsewhere in his bodyLungs: Clear.  Respiratory effort normal.  Skin:    Without rash, bruise, or palpable lesions.  Rheumatologic: Normal joints and nails of the hands.  Neurologic:  Cranial nerves II-XII intact.     Psychiatric:  Mood appropriate, memory intact.          MEDICATIONS:  Current Outpatient Medications   Medication Sig Dispense Refill     aspirin 81 MG EC tablet Take 81 mg by mouth daily.       UNABLE TO FIND Med Name:Allergy medications over the counter       No current facility-administered medications for this visit.          ASSESSMENT and PLAN:   Dejan was seen today for mass.    Diagnoses and all orders for this visit:    Mass of postauricular area-given that I could not evaluate this mass in person and given the descriptive findings of a fixed mass that patient is describing I have recommended an ultrasound to evaluate for enlarged lymph node versus suspicious mass.  We discussed that if there is anything suspicious that likely we would need to reimage with fine-needle aspiration which he agreed to.  He has had many lesions taken off his scalp and given his lack of hair in that area and outdoor exposure he could have some skin cancers that his body is reacting to with lymphadenopathy.  He has agreed to a referral to a dermatologist for his full skin evaluation as noted below.  Depending on what the ultrasound shows we will decide if it is something that he needs further blood work or evaluation for. Cannot exclude sebaceous cyst which he has had hx of. No other systemic sx.   -     US Neck Limited; Future    Screening exam for skin cancer  -     Ambulatory referral to Dermatology          There are no Patient Instructions on file for this visit.    No orders of the defined types were placed in this encounter.    There are no discontinued medications.    We will follow-up with patient depending on results of ultrasound    DATA REVIEWED:  Additional History from Old Records Summarized (2): Reviewed operative note regarding excision of mass on his scalp  Decision to  Obtain Records (1):   Radiology Tests Summarized or Ordered (1):  Ultrasound of neck ordered  Labs Reviewed or Ordered (1): Reviewed pathology report indicating noncancerous    Medicine Test Summarized or Ordered (1):    Independent Review of EKG, X-RAY, or RAPID STREP (2 each):      The visit lasted a total of 15 minutes face to face with the patient. Over 50% of the time was spent counseling and educating the patient     Mili Almeida DO

## 2021-06-07 NOTE — TELEPHONE ENCOUNTER
----- Message from Mili Almeida DO sent at 5/6/2020  8:20 AM CDT -----  Bill the lump that you were describing happens to be something coming off of your parathyroid gland.  You have 4 of those that sit surrounding your thyroid gland typically.  The endocrinologist indicated that it could be aspirated/biopsied with a needle so I am going to place that referral so we can better understand what is happening.  You can use the same phone number to call and schedule [phone] otherwise someone should be reaching out to you.    -Clinical assistant please call patient to give this information in case he does not see his MyChart

## 2021-06-08 ENCOUNTER — RECORDS - HEALTHEAST (OUTPATIENT)
Dept: FAMILY MEDICINE | Facility: CLINIC | Age: 66
End: 2021-06-08

## 2021-06-08 DIAGNOSIS — E04.1 THYROID NODULE: ICD-10-CM

## 2021-06-08 NOTE — TELEPHONE ENCOUNTER
"New Patient Oncology Nurse Navigator Note     Referring provider: Dr. Almeida     Referring Clinic/Organization: [facility]     Referred to (specialty): Medical Oncology    Requested provider (if applicable): N/A     Date Referral Received: 5/19/2020     Evaluation for : B-Cell Lymphoma, Left Parotid mass     Clinical History (per Nurse review of records provided):  \"Bill\" noticeda lump under his right jaw since February that continued to enlarge, no pain. He presented to PCP, Dr. Almeida, on 4/30/2020 and US was ordered and completed on 5/5 to further evaluate. US guided FNA was performed on 5/12/2020 and pathology positive for B-cell lymphoma. Of note, he has had a left post auricular biopsy on 1/24/2017 as well as a scalp lesion biopsy on the same date as well as another Left auricular biopsy on 3/9/17, all path negative.((in Media section of Epic, ). Also reports additional scalp biopsy 15 years ago at . All of his healthcare has been in [facility] although he had some physicals many years through his work with no abnormal findings or labs in the past. 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 at our [place] location. He as instructed to arrive at our facility wearing a mask. He understands the visitor restrictions due to the pandemic and may have a supportive person listen in to his consult by phone. Appointment letter/map with details about his appointment, health history and medication to complete and physician " biography were all sent to his confirmed e-mail address in Epic. He understands that a nurse navigator from [facility] will be assigned  and will follow up with him after his consult. Briefly explained role of the navigator. In the short term, he was given writer's contact information for questions or concerns that arise.   *Of note, Stephani Cisse RN was subsequently assigned to his care team. PCP was notified that his appointment was arranged.

## 2021-06-08 NOTE — TELEPHONE ENCOUNTER
Patient contacted with results of biopsy reviewed at [place].  Result is B cell lymphoma but further classification is not possible because of material lacking tissue architecture.  Excisional biopsy is recommended.  Patient reports that the lymph node is stable in size and he has no new symptoms.  We will review his case at tumor conference and then get back to him by June 22 as to our recommendations.  We will consider excisional biopsy versus observation.

## 2021-06-08 NOTE — PROGRESS NOTES
[facility]   [place]-Pelvic Initial Evaluation    Patient Name: Dejan Zarco  Date of evaluation: 6/16/2020  [facility]  Referral Diagnosis: Acute left-sided low back pain without sciatica   Referring provider: Mj Browne MD  Visit Diagnosis:     ICD-10-CM    1. Left-sided low back pain without sciatica, unspecified chronicity  M54.5        Assessment:   Dejan Zarco is a 65 y.o. male who presents to therapy today with chief complaints of (L) low back pain. Onset date of sx was 5/20.  Functional impairments include bending, lifting, getting in the car, recreational activities.  Clinical findings include lumbar shift, decreased trunk ROM, decreased hip/LE flexibility, fair abdominal recruitment, decreased segmental mobility of lumbar spine.          Pt. is appropriate for skilled PT intervention as outlined in the Plan of Care (POC).  Pt. is a good candidate for skilled PT services to improve pain levels and function.    Goals:  Pt. will demonstrate/verbalize independence in self-management of condition in : 6 weeks  Pt. will be independent with home exercise program in : 6 weeks  Other:: for household tasks and personal cares without back pain in 4-6 weks.  Patient will return to: sport;exercise;in 6 weeks;Comment  Comment: return to golf and kayaking without pain    Pt will: be able to lift for yard work and household tasks without pain in 6 weeks.      Patient's expectations/goals are realistic.    Barriers to Learning or Achieving Goals:  No Barriers.       Plan / Patient Instructions:        Plan of Care:   Authorization / Certification Number of Visits: [facility]are/20 visits/yr  Communication with: Referral Source  Patient Related Instruction: Nature of Condition;Treatment plan and rationale;Self Care instruction;Basis of treatment;Body mechanics;Posture;Next steps  Times per Week: 1-2  Number of Weeks: 6-8  Number of Visits: up to 12 PRN  Discharge Planning: HEP,  self managment  Precautions / Restrictions : none  Therapeutic Exercise: ROM;Stretching;Strengthening  Neuromuscular Reeducation: posture;kinesio tape;core  Manual Therapy: myofascial release;strain counterstrain;joint mobilization;other  Manual Therapy: as needed      POC and pathology of condition were reviewed with patient.  Pt. is in agreement with the Plan of Care  A Home Exercise Program (HEP) was initiated today.    Plan for next visit: manual therapy, posture/body mechanics education, progression of home program.      Subjective:         Social information:   Living Situation:single family home   Occupation:retired   Work Status:NA   Equipment Available: None    History of Present Illness:    Dejan is a 65 y.o. male who presents to therapy today with complaints of (L) low back pain, spasming. Denies leg symptoms. Date of onset/duration of symptoms is . Onset was related to working on a [employer] project, lifting heavy items. He reports a 40 year history of similar problems, every few years, usually resolved without intervention. Sx are persisting longer an usual this time. Better with medications. Symptoms are intermittent. He reports  an episodic  history of similar symptoms. He describes their previous level of function as not limited    Pain Ratin  Pain rating at best: 1  Pain rating at worst: 4  Pain description: pain and spasming    Functional limitations are described as occurring with:   bending  lifting  sitting after getting out of car  transitional movements getting in  car and sit to stand  after driving for longer periods   Avoiding golf, kayaking    Patient reports benefit from:  pain medication, heat, cold    No past medical history on file.  Past Surgical History:   Procedure Laterality Date     CYST REMOVAL      Scalp     US BIOPSY FINE NEEDLE ASPIRATION LYMPH NODE (BREAST) LEFT Left 2020     US BIOPSY FINE NEEDLE ASPIRATION LYMPH NODE (BREAST) LEFT Left 2020     Patient  Active Problem List   Diagnosis     Lipoma Of The Adipose Tissue     Non-Hodgkin lymphoma of lymph nodes of neck (H)     Acute left-sided low back pain without sciatica          Objective:      Note: Items left blank indicates the item was not performed or not indicated at the time of the evaluation.    Patient Outcome Measures :    Modified Oswestry Low Back Pain Disablity Questionnaire  in %: 22     Scores range from 0-100%, where a score of 0% represents minimal pain and maximal function. The minimal clinically important difference is a score reduction of 12%.    Examination  1. Left-sided low back pain without sciatica, unspecified chronicity       Precautions/Restrictions: None  Involved side: Left  Posture Observation:      General sitting posture is  fair.  General standing posture is fair.  Shoulder/Thoracic complex: Moderately increased CT junction thoracic kyphosis  Lumbopelvic complex: Moderately decreased lumbar lordosis  Pelvic alignment: (L) PSIS ant in standing  (R) lumbar shift    Lumbar ROM:    % of normal  Date:      *Indicate scale AROM AROM AROM   Lumbar Flexion 60% tight in HS     Lumbar Extension 25-30%      Right Left Right Left Right Left   Lumbar Sidebending 60% (L) LB 50%       Lumbar Rotation         Thoracic Flexion      Thoracic Extension      Thoracic Sidebending         Thoracic Rotation           Lower Extremity Strength:   NA today  Date:      LE strength/5 Right Left Right Left Right Left   Hip Flexion (L1-3)         Hip Extension (L5-S1)         Hip Abduction (L4-5)         Hip Adduction (L2-3)         Hip External Rotation         Hip Internal Rotation         Knee Extension (L3-4)         Knee Flexion         Ankle Dorsiflexion (L4-5)         Great Toe Extension (L5)         Ankle Plantar flexion (S1)         Abdominals Fair recruitment       Sensation    NA today       Reflex Testing  Lumbar Dermatomes Right Left UE Reflexes Right Left   Iliac Crest and Groin (L1)   Biceps  "(C5-6)     Anterior Medial Thigh (L2)   Brachioradialis (C5-6)     Anterior Thigh, Medial Epicondyle Femur (L3)   Triceps (C7-8)     Lateral Thigh, Anterior Knee, Medial Leg/Malleolus (L4)   Arelis s test     Lateral Leg, Dorsal Foot (L5)   LE Reflexes     Lateral Foot (S1)   Patellar (L3-4)     Posterior Leg (S2)   Achilles (S1-2)     Other:   Babinski Response         Lumbar Special Tests:     Lumbar Special Tests Right Left SI Tests Right  Left   Quadrant test   SI Compression     Straight leg raise 54 55 SI Distraction     Crossover response   POSH Test     Slump   Sacral Thrust     Sit-up test  FADIR     Trunk extensor endurance test  VLADIMIR     Prone instability test  Resisted Abduction     Pubic shotgun  Other:       LE Screen/flexibility:  Hip IR  (R) 20     (L) 22  Hip ER (R) WFL    (L) WFL    Palpation: no tenderness of lumbar paraspinals, QL, SI joints, sup iliac crest, med/lat post femur    Passive Mobility - Joint Integrity:  No pain with PA pressures to lumbar spine .  Moderately decreased segmental mobility of lumbar spine/mult levels .    Exercises:  Exercise #1: HS stretch  Comment #1: 5 x 10\"  Exercise #2: LEEROY  Comment #2: 2-3 min  Exercise #3: SKC  Comment #3: 5 x 10\"  Exercise #4: abdominal sets  Comment #4: 10 x 5\"  Exercise #5: supine trunk rot  Comment #5: 5 x 10\"      Treatment Today     TREATMENT MINUTES COMMENTS   Evaluation 30 Plan of care and goals developed in collaboration with patient.   Discussed findings/condition, related anatomy.   Self-care/ Home management     Manual therapy 10 Induction, indirect, direct techniques utilized as appropriate for optimal tissue release.   MFR/SCS - prone LS traction, (L) lumbar paraspinals   Neuromuscular Re-education     Therapeutic Activity     Therapeutic Exercises 15 Exercises per flow sheet.    Gait training     Modality__________________                Total 55    Blank areas are intentional and mean the treatment did not include these items. "     PT Evaluation Code: (Please list factors)  Patient History/Comorbidities: 1/see PMH  Examination: 2  Clinical Presentation: stable  Clinical Decision Making: low    Patient History/  Comorbidities Examination  (body structures and functions, activity limitations, and/or participation restrictions) Clinical Presentation Clinical Decision Making (Complexity)   No documented Comorbidities or personal factors 1-2 Elements Stable and/or uncomplicated Low   1-2 documented comorbidities or personal factor 3 Elements Evolving clinical presentation with changing characteristics Moderate   3-4 documented comorbidities or personal factors 4 or more Unstable and unpredictable High                Patrica Gutierrez PT  6/16/20

## 2021-06-08 NOTE — PROGRESS NOTES
HPI: Pt is here for follow up of a cyst and mole removal.   he is doing well.  Pain is well controlled.  No difficulties with the surgical wound/wounds.  he is eating well and denies fever and chills.  He is due to have his sutures removed.       Visit Vitals     /87     Pulse 78     SpO2 98%       EXAM:  GENERAL:Appears well  SURGICAL WOUNDS:  Incisions healing well, no enduration or drainage.  Sutures removed without difficulty.        Assessment/Plan: .  Discussed pathology and explained I will follow up with Dr. Tellez and we will be in touch with him regarding any future excision.   Doing well after surgery and should follow up as needed.      Aidan Marin, [facility] Department of Surgery

## 2021-06-08 NOTE — PROGRESS NOTES
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  PET scan shows 8 mm area of uptake in the parotid gland and also some uptake in a thyroid nodule. Patient has fatigue but still very active.    [place] review of pathology unavailable.    Will observe for now.  Will contact him with plans once pathology review is available.    Could observe versus repeat biopsy or treat with 3 cycles of chemo and IF radiation if lymphoma.    25 minutes spent.    PLAN;  Await pathology  Will contact patient with results and plan    ECOG Performance   ECOG Performance Status: 0    Distress Assessment  Distress Assessment Score: No distress    Pain       Problem List    No diagnosis found.     CC: Ned Kwong MD    ______________________________________________________________________________    History of Present Illness    . Dejan Zarco is here for follow-up.  Right neck mass has not changed.  Reports fatigue but he is very active and biked 30 miles yesterday and also worked in his yard.  No other new symptoms or problems.    Pain Status  Currently in Pain: No/denies    Review of Systems    Constitutional  Constitutional (WDL): Exceptions to WDL  Fatigue: Fatigue relieved by rest  Neurosensory  Neurosensory (WDL): All neurosensory elements are within defined limits  Cardiovascular  Cardiovascular (WDL): All cardiovascular elements are within defined limits  Pulmonary  Respiratory (WDL): Within Defined Limits  Gastrointestinal  Gastrointestinal (WDL): All gastrointestinal elements are within defined limits  Genitourinary  Genitourinary (WDL): All genitourinary elements are  within defined limits  Integumentary  Integumentary (WDL): All integumentary elements are within defined limits  Patient Coping     Distress Assessment  Distress Assessment Score: No distress  Accompanied by  Accompanied by: Alone    Past History  History reviewed. No pertinent past medical history.      Past Surgical History:   Procedure Laterality Date     CYST REMOVAL      Scalp     US BIOPSY FINE NEEDLE ASPIRATION LYMPH NODE (BREAST) LEFT Left 5/12/2020     US BIOPSY FINE NEEDLE ASPIRATION LYMPH NODE (BREAST) LEFT Left 5/12/2020       Physical Exam    Recent Vitals 6/1/2020   Height -   Weight 194 lbs 13 oz   BSA (m2) 2.12 m2   /86   Pulse 80   Temp 98.1   Temp src 1   SpO2 96   Some recent data might be hidden       GENERAL: Alert and oriented to time place and person. Seated comfortably. In no distress.    HEAD: Atraumatic and normocephalic.    EYES: LOUIE, EOMI.  No pallor.  No icterus.    Oral cavity: no mucosal lesion or tonsillar enlargement.    NECK: supple. JVP normal.  No thyroid enlargement.    LYMPH NODES: No palpable, cervical, axillary or inguinal lymphadenopathy.    CHEST: clear to auscultation bilaterally.  Resonant to percussion throughout bilaterally.  Symmetrical breath movements bilaterally.    CVS: S1 and S2 are heard. Regular rate and rhythm.  No murmur or gallop or rub heard.  No peripheral edema.    ABDOMEN: Soft. Not tender. Not distended.  No palpable hepatomegaly or splenomegaly.  No other mass palpable.  Bowel sounds heard.    EXTREMITIES: Warm.    SKIN: no rash, or bruising or purpura.  Has a full head of hair.      Lab Results    Recent Results (from the past 168 hour(s))   POCT Glucose    Specimen: Capillary; Blood   Result Value Ref Range    Glucose 92 70 - 139 mg/dL       Imaging    Us Thyroid    Result Date: 5/13/2020  EXAM: US THYROID LOCATION: [facility] DATE/TIME: 5/12/2020 11:15 AM INDICATION: suspicious lesions seen on CT COMPARISON: CT dated 2/21/2019  TECHNIQUE: Thyroid ultrasound. FINDINGS: RIGHT lobe: 6.6 x 2.7 x 2.9  cm. Heterogenous echotexture. Isthmus: 9  mm. LEFT lobe: 6.8 x 3.4 x 3.2  cm. Heterogenous echotexture. NECK: No cervical lymphadenopathy. NODULES: Nodule A: Labeled left thyroid nodule 2 on the worksheet. 2.4 x 2.0 x 2.2 cm lesion in the inferior left thyroid lobe . Composition: Spongiform, 0 points Echogenicity: Hypoechoic, 2 points Shape: Wider-than-tall, 0 points Margin: Smooth, 0 points Echogenic Foci: None, or large comet-tail artifacts, 0 points Point Total: 1-2 points. TI-RADS 2. No FNA.   Nodule B: Large isthmus nodule seen on CT: 2.5 x 2.1 x 1.5 cm Composition: Spongiform, 0 points Echogenicity: Hyperechoic or isoechoic, 1 point Shape: Wider-than-tall, 0 points Margin: Smooth, 0 points Echogenic Foci: None, or large comet-tail artifacts, 0 points Point Total: 1-2 points. TI-RADS 2. No FNA. Nodule C: Labeled left thyroid nodule 1 on the worksheet. 2.1 x 1.9 x 1.4 cm nodule in the mid left thyroid lobe. Composition: Spongiform, 0 points Echogenicity: Hypoechoic, 2 points Shape: Wider-than-tall, 0 points Margin: Smooth, 0 points Echogenic Foci: None, or large comet-tail artifacts, 0 points Point Total: 1-2 points. TI-RADS 2. No FNA. Nodule D: Labeled right thyroid nodule 1 on the worksheet. 0.8 x 0.7 x 1.0 cm nodule in the superior right thyroid lobe. Composition: Solid or almost completely solid, 2 points Echogenicity: Hypoechoic, 2 points Shape: Wider-than-tall, 0 points Margin: Smooth, 0 points Echogenic Foci: None, or large comet-tail artifacts, 0 points Point Total: 4-6 points. TI-RADS 4. If 1.5 cm or larger, recommend FNA; if 1 cm or larger, follow up US (annually for 5 years).     1.  Multinodular thyroid. 2.  A 1 cm right thyroid nodule meets criteria for twelve-month follow-up. Nodules are characterized per ACR Thyroid Imaging, Reporting and Data System (TI-RADS): White Paper of the ACR TI-RADS Committee Roland Rizvi. et al.  Journal of the American College of Radiology 2017. Volume 14 (2017), Issue 5, 587-710.     Us Neck Limited    Result Date: 5/5/2020  EXAM: US NECK LIMITED LOCATION: [facility] DATE/TIME: 5/5/2020 11:36 AM INDICATION: Scans were performed at the site of right superior neck lump. COMPARISON: None. TECHNIQUE: Routine. FINDINGS: Palpable lump corresponds to a 1.5 x 0.9 x 0.6 cm isoechoic to hypoechoic lesion within the posterior superficial lobe of the right parotid gland. Second hypoechoic lesion slightly deeper in the right bladder gland measuring 0.9 x 0.6 x 0.6 cm has more low-level internal echoes within the superficial lesion. The superficial lesion could represent either a parotid cyst or mass. The deeper lesion could represent intraparotid mass or lymph node.     These lesions would be amenable to ultrasound-guided FNA.    Nm Pet Ct Skull To Mid Thigh    Result Date: 5/28/2020  EXAM: NM PET CT SKULL TO MID THIGH LOCATION: [facility] DATE/TIME: 5/28/2020 2:29 PM INDICATION: Initial treatment planning and staging for diffuse large B-cell lymphoma, extranodal and solid organs sites. Biopsy-proven right parotid gland lymphoma. COMPARISON: Thyroid ultrasound dated 05/12/2020 TECHNIQUE: Serum glucose level 92 mg/dL. One hour post intravenous administration of 8.5 mCi F-18 FDG, PET imaging was performed from the skull base to the mid thighs utilizing attenuation correction with concurrent axial CT and PET/CT image fusion. Dose  reduction techniques were used. FINDINGS: Isolated FDG avid 8 mm lesion in the inferior aspect of the right superficial parotid gland (max SUV 5.2) consistent with the biopsy-proven diffuse large B-cell lymphoma. FDG avid left thyroid nodule in the posterior mid thyroid lobe measuring approximately 1.2 x 1.6 cm (max SUV 5.8), a third of which represent primary thyroid neoplasms. Mild senescent intracranial changes. Punctate nonobstructing left renal calculus. Small  fat-containing umbilical hernia. Sigmoid diverticula. Pelvic phleboliths. Multilevel degenerative changes in spine.     1. Findings suspicious for isolated biopsy-proven diffuse large B-cell lymphoma involving the right superficial parotid gland. 2. FDG avid left thyroid nodule, third of which represent primary thyroid neoplasms. This is amenable to ultrasound-guided histologic sampling if desired.    Us Biopsy Parotid Fna    Result Date: 5/13/2020  EXAM: 1. FINE-NEEDLE ASPIRATION DEEP INFERIOR RIGHT PAROTID LESION 2. FINE-NEEDLE ASPIRATION SUPERFICIAL SUPERIOR RIGHT PAROTID LESION 3. ULTRASOUND GUIDANCE LOCATION: [facility] DATE/TIME: 5/12/2020 12:38 PM INDICATION: lesion seen on R PAROTID GLAND (not thyroid) please FNA to characterize PROCEDURE: Informed consent obtained. Time out performed. The site was prepped and draped in sterile fashion. 4  mL of 1% lidocaine was infused into the local soft tissues. Under direct ultrasound guidance, multiple fine-needle aspirates of the deep more  inferior nodule were obtained. Subsequently, multiple fine-needle aspirates of the more superficial superior right parotid nodule were obtained. The tissue was felt to be adequate by pathology. RADIOLOGIC SUPERVISION AND INTERPRETATION: ULTRASOUND GUIDANCE: Images show the needles within the nodules.     1.  Status post ultrasound-guided fine-needle aspiration of 2 right parotid lesions Reference CPT Codes: 91696, 31515    Us Biopsy Parotid Fna    Result Date: 5/13/2020  EXAM: 1. FINE-NEEDLE ASPIRATION DEEP INFERIOR RIGHT PAROTID LESION 2. FINE-NEEDLE ASPIRATION SUPERFICIAL SUPERIOR RIGHT PAROTID LESION 3. ULTRASOUND GUIDANCE LOCATION: [facility] DATE/TIME: 5/12/2020 12:38 PM INDICATION: lesion seen on R PAROTID GLAND (not thyroid) please FNA to characterize PROCEDURE: Informed consent obtained. Time out performed. The site was prepped and draped in sterile fashion. 4  mL of 1% lidocaine was infused into the  local soft tissues. Under direct ultrasound guidance, multiple fine-needle aspirates of the deep more  inferior nodule were obtained. Subsequently, multiple fine-needle aspirates of the more superficial superior right parotid nodule were obtained. The tissue was felt to be adequate by pathology. RADIOLOGIC SUPERVISION AND INTERPRETATION: ULTRASOUND GUIDANCE: Images show the needles within the nodules.     1.  Status post ultrasound-guided fine-needle aspiration of 2 right parotid lesions Reference CPT Codes: 69081, 19556        Signed by: Tamika Torres MD

## 2021-06-08 NOTE — TELEPHONE ENCOUNTER
Received call from radiologist to confirm orders placed for FNA. He reviewed US and CT scan.   It is parotid and not parathyroid. We agreed to get the parotid and he can use his discretion which to biopsy. Also will order imaging because there were some nodules seen on the CT scan of thyroid that rads recommended looking at. If suspicious he will talk to the pt about biopsying              Follow up- dr granados called back to indicate they biopsied the lesions .   Thyroid did not look concerning but to monitor thyroid in 12 mo

## 2021-06-08 NOTE — TELEPHONE ENCOUNTER
I was able to connect with Dr. Garcia and he confirms that there are still some studies pending but preliminary studies appear to be B-cell lymphoma.  I communicated this with the patient I will place referral to oncology.  I am hoping he will at least hear something about that referral in the next 48 hours otherwise he should let me know and I would let him know as soon as I get the final pathology report

## 2021-06-08 NOTE — CONSULTS
[facility] Hematology and Oncology Consult Note    Patient: Dejan Zarco  MRN: [id number]  Date of Service: 05/22/2020        Reason for Visit    I was consulted by Dr. 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  This is based on BCL-6 positivity and FISH abnormalities.  Initial evaluation suggested atypical lymphoid infiltrate.    Patient notes decrease in size of the mass in the last week.  No associated B-cell symptoms.    We will check LDH today.  We will schedule PET scan.  We will send path to [facility] for second opinion.  We will see patient back in 2 weeks to review results and make further recommendations.    Discussed natural history of diffuse large B-cell lymphoma.  Explained that bone marrow may be required for additional staging.    Discussed treatment which is with multiple chemotherapy agents and Rituxan administered 1 day every 3 weeks for between 6 to an 8 cycles.  Generally treated with curative intent.  Some patients can be treated with chemotherapy and radiation.  Surgery is not typically part of the treatment.    Thyroid nodule requires repeat ultrasound in a year.    Patient's questions answered.    Plan: Check LDH today  Schedule PET scan  Send path to [place] for second opinion  Follow-up in 2 weeks to review          ECOG Performance   ECOG Performance Status: 1  Distress Assessment  Distress Assessment Score: No distress        Problem List    1. Non-Hodgkin lymphoma of lymph nodes of neck, unspecified non-Hodgkin lymphoma type (H)  LD(LDH)    NM PET CT Bone Scan           CC: Ned Kwong MD      ______________________________________________________________________________      Staging History    Cancer Staging  No matching staging information was found for the patient.    History    Mr. Dejan Adams  Carolee is a very pleasant 64-year-old with no significant past history who is referred for lymphoma.  He noticed a mass in the right parotid gland area starting in February which was slowly growing in size.  Eventually had a visit with his primary and then an ultrasound and biopsy.  Pathology initially was read as atypical lymphoid infiltrate but based on additional studies is felt to be consistent with diffuse B large cell lymphoma.    Patient has noted some mild fatigue.  No fever chills or sweats at night.  Some throat discomfort.  Does have history of seasonal allergies.    No headaches.  No dysphasia or done aphasia.  Appetite and weight are stable.  No shortness of breath or cough.  No PND orthopnea or leg swelling.  No change with bowels or urine.  No bleeding or rash.  ECOG status is 0.  He is an avid biker and bike 30 miles yesterday.    No significant past history.  Had a cyst and skin abnormality removed from his head in the last couple of years.  No hospitalizations.    He is retired from being an .  He is  with 2 kids.  Lives in his home.    No previous personal history of cancer.  Mother  of ovarian cancer at the age of 75.  She was treated with surgery and chemotherapy.    Does not smoke.  Drinks occasionally.        Past History  History reviewed. No pertinent past medical history.  Past Surgical History:   Procedure Laterality Date     CYST REMOVAL      Scalp     US BIOPSY FINE NEEDLE ASPIRATION LYMPH NODE (BREAST) LEFT Left 2020     US BIOPSY FINE NEEDLE ASPIRATION LYMPH NODE (BREAST) LEFT Left 2020     Family History   Problem Relation Age of Onset     Ovarian cancer Mother 76     Heart disease Father      Stroke Father      No Medical Problems Sister      Heart disease Brother      Diabetes Brother      No Medical Problems Brother      Social History     Socioeconomic History     Marital status:      Spouse name: None     Number of children:  None     Years of education: None     Highest education level: None   Occupational History     None   Social Needs     Financial resource strain: None     Food insecurity     Worry: None     Inability: None     Transportation needs     Medical: None     Non-medical: None   Tobacco Use     Smoking status: Never Smoker     Smokeless tobacco: Never Used   Substance and Sexual Activity     Alcohol use: Yes     Comment: 1-2/week     Drug use: No     Sexual activity: None   Lifestyle     Physical activity     Days per week: None     Minutes per session: None     Stress: None   Relationships     Social connections     Talks on phone: None     Gets together: None     Attends Yazidi service: None     Active member of club or organization: None     Attends meetings of clubs or organizations: None     Relationship status: None     Intimate partner violence     Fear of current or ex partner: None     Emotionally abused: None     Physically abused: None     Forced sexual activity: None   Other Topics Concern     None   Social History Narrative     None     Allergies    No Known Allergies    Review of Systems    General  General (WDL): Exceptions to WDL  Fatigue: Yes - Recent (Less than 3 months)  ENT  ENT (WDL): All ENT elements are within defined limits  Respiratory  Respiratory (WDL): All respiratory elements are within defined limits  Cardiovascular  Cardiovascular (WDL): All cardiovascular elements are within defined limits  Endocrine  Endocrine (WDL): All endocrine elements are within defined limits  Gastrointestinal  Gastrointestinal (WDL): All gastrointestinal elements are within defined limits  Musculoskeletal  Musculoskeletal (WDL): Exceptions to WDL  Back Pain: Yes - Chronic (Greater than 3 months)  Neurological  Neurological (WDL): All neurological elements are within defined limits  Dominant Hand: Right  Psychological/Emotional  Psychological/Emotional (WDL): All psychological/emotional elements are within defined  "limits  Hematological/Lymphatic  Hematological/Lymphatic (WDL): Exceptions to WDL  Dermatological  Dermatologic (WDL): All dermatological elements are within defined limits  Genitourinary/Reproductive  Genitourinary/Reproductive (WDL): All genitourinary/reproductive elements are within defined limits  Reproductive (Females only)     Pain  Currently in Pain: No/denies    Physical Exam    Recent Vitals 5/22/2020   Height 5' 11.5\"   Weight 198 lbs 5 oz   BSA (m2) 2.13 m2   /77   Pulse 84   Temp 98.8   Temp src 1   SpO2 98   Some recent data might be hidden       GENERAL: Alert and oriented to time place and person. Seated comfortably. In no distress.    HEAD: Atraumatic and normocephalic.    EYES: LOUIE, EOMI.  No pallor.  No icterus.    Oral cavity: no mucosal lesion or tonsillar enlargement.    NECK: supple. JVP normal.  Right parotid mass measuring 1.5 cm.  No thyroid enlargement.    LYMPH NODES: No palpable, cervical, axillary or inguinal lymphadenopathy.    CHEST: clear to auscultation bilaterally.  Resonant to percussion throughout bilaterally.  Symmetrical breath movements bilaterally.    CVS: S1 and S2 are heard. Regular rate and rhythm.  No murmur or gallop or rub heard.  No peripheral edema.    ABDOMEN: Soft. Not tender. Not distended.  No palpable hepatomegaly or splenomegaly.  No other mass palpable.  Bowel sounds heard.    EXTREMITIES: Warm.    SKIN: no rash, or bruising or purpura.  Has a full head of hair.    CNS nonfocal.      Lab Results    CBC and CMP from May 12 are normal.  Imaging Results    Us Thyroid    Result Date: 5/13/2020  EXAM: US THYROID LOCATION: [facility] DATE/TIME: 5/12/2020 11:15 AM INDICATION: suspicious lesions seen on CT COMPARISON: CT dated 2/21/2019 TECHNIQUE: Thyroid ultrasound. FINDINGS: RIGHT lobe: 6.6 x 2.7 x 2.9  cm. Heterogenous echotexture. Isthmus: 9  mm. LEFT lobe: 6.8 x 3.4 x 3.2  cm. Heterogenous echotexture. NECK: No cervical lymphadenopathy. NODULES: " Nodule A: Labeled left thyroid nodule 2 on the worksheet. 2.4 x 2.0 x 2.2 cm lesion in the inferior left thyroid lobe . Composition: Spongiform, 0 points Echogenicity: Hypoechoic, 2 points Shape: Wider-than-tall, 0 points Margin: Smooth, 0 points Echogenic Foci: None, or large comet-tail artifacts, 0 points Point Total: 1-2 points. TI-RADS 2. No FNA.   Nodule B: Large isthmus nodule seen on CT: 2.5 x 2.1 x 1.5 cm Composition: Spongiform, 0 points Echogenicity: Hyperechoic or isoechoic, 1 point Shape: Wider-than-tall, 0 points Margin: Smooth, 0 points Echogenic Foci: None, or large comet-tail artifacts, 0 points Point Total: 1-2 points. TI-RADS 2. No FNA. Nodule C: Labeled left thyroid nodule 1 on the worksheet. 2.1 x 1.9 x 1.4 cm nodule in the mid left thyroid lobe. Composition: Spongiform, 0 points Echogenicity: Hypoechoic, 2 points Shape: Wider-than-tall, 0 points Margin: Smooth, 0 points Echogenic Foci: None, or large comet-tail artifacts, 0 points Point Total: 1-2 points. TI-RADS 2. No FNA. Nodule D: Labeled right thyroid nodule 1 on the worksheet. 0.8 x 0.7 x 1.0 cm nodule in the superior right thyroid lobe. Composition: Solid or almost completely solid, 2 points Echogenicity: Hypoechoic, 2 points Shape: Wider-than-tall, 0 points Margin: Smooth, 0 points Echogenic Foci: None, or large comet-tail artifacts, 0 points Point Total: 4-6 points. TI-RADS 4. If 1.5 cm or larger, recommend FNA; if 1 cm or larger, follow up US (annually for 5 years).     1.  Multinodular thyroid. 2.  A 1 cm right thyroid nodule meets criteria for twelve-month follow-up. Nodules are characterized per ACR Thyroid Imaging, Reporting and Data System (TI-RADS): White Paper of the ACR TI-RADS Committee Roland Rizvi et al. Journal of the American College of Radiology 2017. Volume 14 (2017), Issue 5, 967-357.     Us Neck Limited    Result Date: 5/5/2020  EXAM: US NECK LIMITED LOCATION: [facility] DATE/TIME: 5/5/2020 11:36 AM  INDICATION: Scans were performed at the site of right superior neck lump. COMPARISON: None. TECHNIQUE: Routine. FINDINGS: Palpable lump corresponds to a 1.5 x 0.9 x 0.6 cm isoechoic to hypoechoic lesion within the posterior superficial lobe of the right parotid gland. Second hypoechoic lesion slightly deeper in the right bladder gland measuring 0.9 x 0.6 x 0.6 cm has more low-level internal echoes within the superficial lesion. The superficial lesion could represent either a parotid cyst or mass. The deeper lesion could represent intraparotid mass or lymph node.     These lesions would be amenable to ultrasound-guided FNA.    Us Biopsy Parotid Fna    Result Date: 5/13/2020  EXAM: 1. FINE-NEEDLE ASPIRATION DEEP INFERIOR RIGHT PAROTID LESION 2. FINE-NEEDLE ASPIRATION SUPERFICIAL SUPERIOR RIGHT PAROTID LESION 3. ULTRASOUND GUIDANCE LOCATION: [facility] DATE/TIME: 5/12/2020 12:38 PM INDICATION: lesion seen on R PAROTID GLAND (not thyroid) please FNA to characterize PROCEDURE: Informed consent obtained. Time out performed. The site was prepped and draped in sterile fashion. 4  mL of 1% lidocaine was infused into the local soft tissues. Under direct ultrasound guidance, multiple fine-needle aspirates of the deep more  inferior nodule were obtained. Subsequently, multiple fine-needle aspirates of the more superficial superior right parotid nodule were obtained. The tissue was felt to be adequate by pathology. RADIOLOGIC SUPERVISION AND INTERPRETATION: ULTRASOUND GUIDANCE: Images show the needles within the nodules.     1.  Status post ultrasound-guided fine-needle aspiration of 2 right parotid lesions Reference CPT Codes: 68033, 25979    Us Biopsy Parotid Fna    Result Date: 5/13/2020  EXAM: 1. FINE-NEEDLE ASPIRATION DEEP INFERIOR RIGHT PAROTID LESION 2. FINE-NEEDLE ASPIRATION SUPERFICIAL SUPERIOR RIGHT PAROTID LESION 3. ULTRASOUND GUIDANCE LOCATION: [facility] DATE/TIME: 5/12/2020 12:38 PM  INDICATION: lesion seen on R PAROTID GLAND (not thyroid) please FNA to characterize PROCEDURE: Informed consent obtained. Time out performed. The site was prepped and draped in sterile fashion. 4  mL of 1% lidocaine was infused into the local soft tissues. Under direct ultrasound guidance, multiple fine-needle aspirates of the deep more  inferior nodule were obtained. Subsequently, multiple fine-needle aspirates of the more superficial superior right parotid nodule were obtained. The tissue was felt to be adequate by pathology. RADIOLOGIC SUPERVISION AND INTERPRETATION: ULTRASOUND GUIDANCE: Images show the needles within the nodules.     1.  Status post ultrasound-guided fine-needle aspiration of 2 right parotid lesions Reference CPT Codes: 22448, 82588        Signed by: Tamika Torres MD

## 2021-06-08 NOTE — TELEPHONE ENCOUNTER
Harry called.  He said Dr. Torres told him to call today if he had not heard from him by last Friday regarding results on his pathology at the [facility].  I have sent his message onto Dr. Torres via an in-basket message.  Harry has my number to call back if he needs anything.

## 2021-06-08 NOTE — PROGRESS NOTES
"HPI: Pt is here with concerns about a subcutaneous mass located right posterior scalp and a lesion above the left superior ear.  It has been present for about 15 years.   These lesions are not tender.  The lesions have not drained.    Allergies:Review of patient's allergies indicates no known allergies.    History reviewed. No pertinent past medical history.    Past Surgical History   Procedure Laterality Date     Cyst removal       Scalp       REVIEW OF SYSTEMS:  10 point ROS is negative except for; as mentioned above.    CURRENT MEDS:    Current Outpatient Prescriptions:      aspirin 81 MG EC tablet, Take 81 mg by mouth daily., Disp: , Rfl:     Visit Vitals     /86     Pulse 78     Ht 5' 11\" (1.803 m)     Wt 209 lb (94.8 kg)     SpO2 97%     BMI 29.15 kg/m2     Body mass index is 29.15 kg/(m^2).    EXAM:  GENERAL:Well developed he appears his stated age  HEAD & NECK: Extraocular motions intact, anicteric sclera,  ABDOMEN: Soft and nondistended, positive bowel sounds  LUNGS:  CTA  HEART:  RRR  EXTREMITIES: Full mobility,   INTEGUMENT: The patient has a subcutaneous lesion located on the right posterior scalp, 2 cm,  And the lesion above left superior ear 7 mm, it is pigmented and rough along the surface..      Assessment/Plan: The pt has a  2 cm  subcutaneous lesion located on the scalp, and a smaller skin lesion above the L ear.    This lesion is likely Sebaccous Cyst, and the lesion above the ear has signs of potential skin cancer. These lesion is growing in size and/or is painful at times.  With these features I recommend removal of this lesions.     He would like to have these lesions removed. I discussed this with he. I discussed the risk of bleeding and infection with the patient. We will get this scheduled through our clinic.    Bryan Tellez MD  [phone]  [facility] Department of Surgery  "

## 2021-06-08 NOTE — PROGRESS NOTES
Dejan is scheduled for Scalp cyst removal and skin lesion removal above left ear with Dr. Duff on 1/24/17 at [facility]. Patient was given instructions of arrival time, need a , and NPO after midnight. Patient verbalized understanding.     No pre-op needed per Dr. Duff.    Perla Collado [facility]  Physician    [facility] Surgery   [phone]

## 2021-06-08 NOTE — PROGRESS NOTES
Assessment/Plan:    Acute left-sided low back pain without sciatica  Acute low back pain of approximately 4 weeks of duration.  This is related to lifting/moving blocks as part of a landscaping project.  He does not describe radicular symptoms.  I suspect that this is actually a muscular injury.  Pain is increasing with time suggesting he does need some therapy.  He has not tried a conservative approach to therapy yet.  - Patient will take 4 and 40 mg of naproxen twice daily x14 days  - We discussed home physical therapy.  I also referred him to formal physical therapy.  - Flexeril as needed  -If not improving in 3-4 weeks I would refer to the spine care clinic urgently.  At that point, the sense of urgency would be based on the eminent chemotherapy.     Return in about 4 weeks (around 7/3/2020) for recheck if not improving.    Mj Browne MD  _______________________________    Chief Complaint   Patient presents with     Back Pain     x 1 month, pt states he injured while landscaping his yard      Subjective: Dejan Zarco is a 64 y.o. year old male who I have not seen in clinic before who presents with the following acute complaint(s):    Back pain:   - recent diagnosis of lymphoma.    - back pain started about a month ago.  He was moving blocks as part of a landscaping episode.  History of low back pain.  Very intermittent.  Worsening.  The pain has had a spasm quality.  He describes some weakness.  No radiation.  He was able to bike this week.  Pain is worse with certain movements.     - no saddle anesthesia, weakness, no urinary incontinence   - Palliative: aleve.  This morning helped.  He has taken flexiril which helps.     ROS: Complete review of systems obtained.  Pertinent items are listed above.     The following portions of the patient's history were reviewed and updated as appropriate: allergies, current medications, past medical history and problem list.     Objective:   /80  (Patient Site: Left Arm, Patient Position: Sitting, Cuff Size: Adult Large)   Pulse 76   Temp 98.1  F (36.7  C) (Oral)   Resp 16   Wt 193 lb (87.5 kg)   SpO2 98% Comment: room air  BMI 26.18 kg/m    Gen: nad  Msk/neuro: The thoracic spine is nontender to palpation.  The lumbar spine and sacral spine are nontender to palpation.  The paraspinous muscles bilaterally are nontender to palpation.  The SI joints bilaterally nontender to palpation.  Sensation is intact throughout the legs in all dermatomal distribution including the medial thigh.  2+ reflexes at the patella bilaterally.  The patient walks with a normal gait.  Negative straight leg raise bilaterally.  The patient is able to walk on his toes and his heels without difficulty.  Strength 5/5 in lower extremities bilaterally.    No results found for this or any previous visit (from the past 24 hour(s)).  Us Thyroid    Result Date: 5/13/2020  EXAM: US THYROID LOCATION: [facility] DATE/TIME: 5/12/2020 11:15 AM INDICATION: suspicious lesions seen on CT COMPARISON: CT dated 2/21/2019 TECHNIQUE: Thyroid ultrasound. FINDINGS: RIGHT lobe: 6.6 x 2.7 x 2.9  cm. Heterogenous echotexture. Isthmus: 9  mm. LEFT lobe: 6.8 x 3.4 x 3.2  cm. Heterogenous echotexture. NECK: No cervical lymphadenopathy. NODULES: Nodule A: Labeled left thyroid nodule 2 on the worksheet. 2.4 x 2.0 x 2.2 cm lesion in the inferior left thyroid lobe . Composition: Spongiform, 0 points Echogenicity: Hypoechoic, 2 points Shape: Wider-than-tall, 0 points Margin: Smooth, 0 points Echogenic Foci: None, or large comet-tail artifacts, 0 points Point Total: 1-2 points. TI-RADS 2. No FNA.   Nodule B: Large isthmus nodule seen on CT: 2.5 x 2.1 x 1.5 cm Composition: Spongiform, 0 points Echogenicity: Hyperechoic or isoechoic, 1 point Shape: Wider-than-tall, 0 points Margin: Smooth, 0 points Echogenic Foci: None, or large comet-tail artifacts, 0 points Point Total: 1-2 points. TI-RADS 2. No FNA.  Nodule C: Labeled left thyroid nodule 1 on the worksheet. 2.1 x 1.9 x 1.4 cm nodule in the mid left thyroid lobe. Composition: Spongiform, 0 points Echogenicity: Hypoechoic, 2 points Shape: Wider-than-tall, 0 points Margin: Smooth, 0 points Echogenic Foci: None, or large comet-tail artifacts, 0 points Point Total: 1-2 points. TI-RADS 2. No FNA. Nodule D: Labeled right thyroid nodule 1 on the worksheet. 0.8 x 0.7 x 1.0 cm nodule in the superior right thyroid lobe. Composition: Solid or almost completely solid, 2 points Echogenicity: Hypoechoic, 2 points Shape: Wider-than-tall, 0 points Margin: Smooth, 0 points Echogenic Foci: None, or large comet-tail artifacts, 0 points Point Total: 4-6 points. TI-RADS 4. If 1.5 cm or larger, recommend FNA; if 1 cm or larger, follow up US (annually for 5 years).     1.  Multinodular thyroid. 2.  A 1 cm right thyroid nodule meets criteria for twelve-month follow-up. Nodules are characterized per ACR Thyroid Imaging, Reporting and Data System (TI-RADS): White Paper of the ACR TI-RADS Committee Roland Rizvi. et al. Journal of the American College of Radiology 2017. Volume 14 (2017), Issue 5, 458-196.     Us Neck Limited    Result Date: 5/5/2020  EXAM: US NECK LIMITED LOCATION: [facility] DATE/TIME: 5/5/2020 11:36 AM INDICATION: Scans were performed at the site of right superior neck lump. COMPARISON: None. TECHNIQUE: Routine. FINDINGS: Palpable lump corresponds to a 1.5 x 0.9 x 0.6 cm isoechoic to hypoechoic lesion within the posterior superficial lobe of the right parotid gland. Second hypoechoic lesion slightly deeper in the right bladder gland measuring 0.9 x 0.6 x 0.6 cm has more low-level internal echoes within the superficial lesion. The superficial lesion could represent either a parotid cyst or mass. The deeper lesion could represent intraparotid mass or lymph node.     These lesions would be amenable to ultrasound-guided FNA.    Nm Pet Ct Skull To Mid  Thigh    Result Date: 5/28/2020  EXAM: NM PET CT SKULL TO MID THIGH LOCATION: [facility] DATE/TIME: 5/28/2020 2:29 PM INDICATION: Initial treatment planning and staging for diffuse large B-cell lymphoma, extranodal and solid organs sites. Biopsy-proven right parotid gland lymphoma. COMPARISON: Thyroid ultrasound dated 05/12/2020 TECHNIQUE: Serum glucose level 92 mg/dL. One hour post intravenous administration of 8.5 mCi F-18 FDG, PET imaging was performed from the skull base to the mid thighs utilizing attenuation correction with concurrent axial CT and PET/CT image fusion. Dose  reduction techniques were used. FINDINGS: Isolated FDG avid 8 mm lesion in the inferior aspect of the right superficial parotid gland (max SUV 5.2) consistent with the biopsy-proven diffuse large B-cell lymphoma. FDG avid left thyroid nodule in the posterior mid thyroid lobe measuring approximately 1.2 x 1.6 cm (max SUV 5.8), a third of which represent primary thyroid neoplasms. Mild senescent intracranial changes. Punctate nonobstructing left renal calculus. Small fat-containing umbilical hernia. Sigmoid diverticula. Pelvic phleboliths. Multilevel degenerative changes in spine.     1. Findings suspicious for isolated biopsy-proven diffuse large B-cell lymphoma involving the right superficial parotid gland. 2. FDG avid left thyroid nodule, third of which represent primary thyroid neoplasms. This is amenable to ultrasound-guided histologic sampling if desired.    Us Biopsy Parotid Fna    Result Date: 5/13/2020  EXAM: 1. FINE-NEEDLE ASPIRATION DEEP INFERIOR RIGHT PAROTID LESION 2. FINE-NEEDLE ASPIRATION SUPERFICIAL SUPERIOR RIGHT PAROTID LESION 3. ULTRASOUND GUIDANCE LOCATION: [facility] DATE/TIME: 5/12/2020 12:38 PM INDICATION: lesion seen on R PAROTID GLAND (not thyroid) please FNA to characterize PROCEDURE: Informed consent obtained. Time out performed. The site was prepped and draped in sterile fashion. 4  mL of 1%  lidocaine was infused into the local soft tissues. Under direct ultrasound guidance, multiple fine-needle aspirates of the deep more  inferior nodule were obtained. Subsequently, multiple fine-needle aspirates of the more superficial superior right parotid nodule were obtained. The tissue was felt to be adequate by pathology. RADIOLOGIC SUPERVISION AND INTERPRETATION: ULTRASOUND GUIDANCE: Images show the needles within the nodules.     1.  Status post ultrasound-guided fine-needle aspiration of 2 right parotid lesions Reference CPT Codes: 59394, 71289    Us Biopsy Parotid Fna    Result Date: 5/13/2020  EXAM: 1. FINE-NEEDLE ASPIRATION DEEP INFERIOR RIGHT PAROTID LESION 2. FINE-NEEDLE ASPIRATION SUPERFICIAL SUPERIOR RIGHT PAROTID LESION 3. ULTRASOUND GUIDANCE LOCATION: [facility] DATE/TIME: 5/12/2020 12:38 PM INDICATION: lesion seen on R PAROTID GLAND (not thyroid) please FNA to characterize PROCEDURE: Informed consent obtained. Time out performed. The site was prepped and draped in sterile fashion. 4  mL of 1% lidocaine was infused into the local soft tissues. Under direct ultrasound guidance, multiple fine-needle aspirates of the deep more  inferior nodule were obtained. Subsequently, multiple fine-needle aspirates of the more superficial superior right parotid nodule were obtained. The tissue was felt to be adequate by pathology. RADIOLOGIC SUPERVISION AND INTERPRETATION: ULTRASOUND GUIDANCE: Images show the needles within the nodules.     1.  Status post ultrasound-guided fine-needle aspiration of 2 right parotid lesions Reference CPT Codes: 86971, 99944    Additional History from Old Records Summarized (2): yes  Decision to Obtain Records (1): no  Radiology Tests Summarized or Ordered (1): yes  Labs Reviewed or Ordered (1): no  Medicine Test Summarized or Ordered (1): no  Independent Review of EKG or X-RAY(2 each): no    This note has been dictated using voice recognition software. Any grammatical or  context distortions are unintentional and inherent to the software

## 2021-06-08 NOTE — TELEPHONE ENCOUNTER
I rec'd an e-mail from Harry this afternoon.  He is very frustrated that he has not heard from anyone regarding his phone call yesterday asking for results.  I called Harry to apologize.  I told him I had thought he had been given the information.  I have sent a text message and forwarded Harry's e-mail to Dr. Torres & his nurse, Echo  asking if he could please get back to Harry today.  I told Harry I will check back in with him later today to see if he rec'd a call.

## 2021-06-08 NOTE — PROGRESS NOTES
Request for Second Opinion: [facility].    Form was filled out and signed by Dr. Torres. Form was faxed to our pathology office, [phone].

## 2021-06-09 NOTE — PROGRESS NOTES
[facility] Daily Progress     Patient Name: Dejan Zarco  Date: 2020  Visit #: 3/12 (3/20 [facility])  Referral Diagnosis: Acute left-sided low back pain without sciatica   Referring provider: Mj Browne MD  Visit Diagnosis:     ICD-10-CM    1. Left-sided low back pain without sciatica, unspecified chronicity  M54.5       Assessment from Initial Evaluation:  Dejan Zarco is a 65 y.o. male who presents to therapy today with chief complaints of (L) low back pain. Onset date of sx was .  Functional impairments include bending, lifting, getting in the car, recreational activities.  Clinical findings include lumbar shift, decreased trunk ROM, decreased hip/LE flexibility, fair abdominal recruitment, decreased segmental mobility of lumbar spine.     Assessment:     HEP/POC compliance is  good .  The patient demonstrates stiffness throughout his spine.  He was able to tolerate joint mobilizations well today and has a good understanding of the nerve glides.  He is progressing well and appropriate to continue with skilled PT services at this time.  Will likely drop to 1x/week or every other week after next session.    Goal Status:  Pt. will demonstrate/verbalize independence in self-management of condition in : 6 weeks  Pt. will be independent with home exercise program in : 6 weeks  Other:: for household tasks and personal cares without back pain in 4-6 weks.  Patient will return to: sport;exercise;in 6 weeks;Comment  Comment: return to golf and kayaking without pain    Pt will: be able to lift for yard work and household tasks without pain in 6 weeks.      Plan / Patient Education:     Continue with initial plan of care.  Progress with home program as tolerated.   Consider pec stretch and scapular strengthening.  Consider plank.    Subjective:     Pain Ratin  The patient reports that everything seems to be going well.  He believes he is on the mend.  He has not been  "experiencing many spasms recently.  The only time he has them is first thing in the morning.  The spasm is on the L side and not as severe as they used to be.  He has not been golfing or kayaking.    Objective:     Mod/major limited ROM lumbar spine all planes.  No pain.    Exercises:  Exercise #1: HS stretch  Comment #1: Supine HEP; added seated version with 30\" hold bilaterally  Exercise #2: LEEROY  Comment #2: HEP  Exercise #3: SKC  Comment #3: HEP  Exercise #4: abdominal sets  Comment #4: HEP  Exercise #5: supine trunk rot  Comment #5: HEP  Exercise #6: abd sets w/march, leg ext  Comment #6: HEP  Exercise #7: Slump sliders/slump SLR  Comment #7: x 10 each    Appt time: 10:35AM - 11:01AM    Treatment Today     TREATMENT MINUTES COMMENTS   Evaluation     Self-care/ Home management     Manual therapy 12 R S/L L lumbar rotation mobilizations grades II-IV   Neuromuscular Re-education     Therapeutic Activity     Therapeutic Exercises 14 -Subjective measures taken  -See flow sheet; added nerve glides to HEP for neural mobility and another version of hamstring stretch   Gait training     Modality__________________                Total 26    Blank areas are intentional and mean the treatment did not include these items.       Zahida Garcia  6/23/2020  "

## 2021-06-09 NOTE — PROGRESS NOTES
Case reviewed at tumor conference.  Since his recommendation is for excisional biopsy.  Did speak to the patient about this decision.  Will refer him to ENT for excisional biopsy.  We will schedule him back in our clinic after the biopsy to review and make recommendations about treatment.

## 2021-06-09 NOTE — TELEPHONE ENCOUNTER
Dr Kwong doesn't have any available appointments in the time frame needed. Dr Almeida are you willing to see patient? Your schedule is also pretty full have some same days left?

## 2021-06-09 NOTE — PROGRESS NOTES
[facility] Daily Progress / Discharge Summary    Patient Name: Dejan Zarco  Date: 2020  Visit #: 5  PTA visit #:  NA  Referral Diagnosis: Acute left-sided low back pain without sciatica  Referring provider: Mj Browne MD  Visit Diagnosis:     ICD-10-CM    1. Left-sided low back pain without sciatica, unspecified chronicity  M54.5        Assessment:   HEP/POC compliance is  good .  Patient has benefitted from skilled physical therapy and is making steady progress toward functional goals.  The patient has met all of his PT goals and was able to advance his HEP today.  He is appropriate for DC at this time.    Goal Status:  Pt. will demonstrate/verbalize independence in self-management of condition in : 6 weeks;Met  Pt. will be independent with home exercise program in : 6 weeks;Met  Other:: for household tasks and personal cares without back pain in 4-6 weks; Met  Patient will return to: sport;exercise;in 6 weeks;Comment  Comment: return to golf and kayaking without pain; Met    Pt will: be able to lift for yard work and household tasks without pain in 6 weeks; Met      Plan / Patient Education:     Initial plan of care has been completed. Patient has responded appropriately to skilled PT intervention.  The patient met goals and has demonstrated understanding of/independence in the home program for self-care and progression to next steps.  No further therapy is required at this time.  The patient will initiate contact if questions or concerns arise.    Subjective:     Pain Ratin  The patient reports that everything is going well.  He continues to have maybe 1 spasm per day, otherwise he feels back to normal.  He doesn't feel like he has to limit any activity at this time.  He was able to lift the kayak onto the car without issues.    Objective:     Good exercise technique.    Exercise #1: HS stretch  Comment #1: Supine or seated HEP  Exercise #2: LEEROY  Comment #2:  Progressed to press ups x 10; patient to go back to LEEROY if painful in any way  Exercise #3: SKC  Comment #3: HEP  Exercise #4: Quadruped  Comment #4: Birddog x 5 bilaterally  Exercise #5: supine trunk rot  Comment #5: HEP  Exercise #6: Deadbug  Comment #6: x 6 bilaterally  Exercise #7: Slump sliders/slump SLR  Comment #7: HEP  Exercise #8: Bridging  Comment #8: x 2; bridge march x 10 bilaterally  Exercise #9: Plank  Comment #9: On elbows x 20 seconds    Appt time: 10:46AM - 11:12AM    Treatment Today  TREATMENT MINUTES COMMENTS   Evaluation     Self-care/ Home management     Manual therapy     Neuromuscular Re-education     Therapeutic Activity     Therapeutic Exercises 26 -Subjective measures taken  -See flow sheet; progressed HEP as patient was ready to progress; no increased pain with exercises   Gait training     Modality__________________                Total 26    Blank areas are intentional and mean the treatment did not include these items.     Zahida Garcia, PT, DPT  7/8/2020

## 2021-06-09 NOTE — PROGRESS NOTES
"[facility] Daily Progress     Patient Name: Dejan Zarco  Date: 2020  Visit #2/ ( [facility])  Referral Diagnosis: Acute left-sided low back pain without sciatica   Referring provider: Mj Browne MD  Visit Diagnosis:     ICD-10-CM    1. Left-sided low back pain without sciatica, unspecified chronicity  M54.5          Assessment:     HEP/POC compliance is  good .  Patient demonstrates understanding/independence with home program.  Response to Intervention good. Decreased pain and spasming.   Patient is benefitting from skilled physical therapy and is making steady progress toward functional goals.  Patient is appropriate to continue with skilled physical therapy intervention, as indicated by initial plan of care.    Goal Status:  Pt. will demonstrate/verbalize independence in self-management of condition in : 6 weeks  Pt. will be independent with home exercise program in : 6 weeks  Other:: for household tasks and personal cares without back pain in 4-6 weks.  Patient will return to: sport;exercise;in 6 weeks;Comment  Comment: return to golf and kayaking without pain    Pt will: be able to lift for yard work and household tasks without pain in 6 weeks.      Plan / Patient Education:     Continue with initial plan of care.  Progress with home program as tolerated.   Consider adding some stretching to reduce flexed posture.     Subjective:     Pain Ratin  Doing pretty good. Some muscle soreness in (B) lat trunk and (B) low back. Not having spasms as much, just 1 episode yesterday and none so far today.     Objective:     Mod fascial restrictions of (L) medial lumbar paraspinals, LS junction.   Abdominal recruitment is fair/good.     Exercises:  Exercise #4: abdominal sets  Comment #4: 10 x 5\"  Exercise #6: abd sets /march, leg ext  Comment #6: 10x (B) each      Treatment Today     TREATMENT MINUTES COMMENTS   Evaluation     Self-care/ Home management     Manual therapy 20 " Induction, indirect, direct techniques utilized as appropriate for optimal tissue release.   MFR/SCS - prone LS traction, (L) lumbar paraspinals, (L) LFL3-5-MS,    Neuromuscular Re-education     Therapeutic Activity     Therapeutic Exercises 10 Exercises per flow sheet.   Added new exercises.    Gait training     Modality__________________                Total 30    Blank areas are intentional and mean the treatment did not include these items.       Patrica Gutierrez  6/19/2020

## 2021-06-09 NOTE — PROGRESS NOTES
HISTORY OF PRESENT ILLNESS  Asked to see by Dr. Torres for evaluation of mass right parotid. Patient has biopsy proven lymphoma but more tissue is needed for characterizing the lesion. Patient reports that the lesion is smaller but still present. No pain. No fever, sweats or chills. He explains that his treatment has been delayed until we can get more tissue.     REVIEW OF SYSTEMS  Review of Systems: a 10-system review was performed. Pertinent positives are noted in the HPI and on a separate scanned document in the chart.    PMH, PSH, FH and SH has documented in the EHR.      EXAM    CONSTITUTIONAL  General Appearance:  Normal, well developed, well nourished, no obvious distress  Ability to Communicate:  communicates appropriately.    HEAD AND FACE  Appearance and Symmetry:  Normal, no scalp or facial scarring or suspicious lesions.  Paranasal sinuses tenderness:  Normal, Paranasal sinuses non tender    EARS  Clinical speech reception threshold:  Normal, able to hear normal speech.  Auricle:  Normal, Auricles without scars, lesions, masses.  External auditory canal:  Normal, External auditory canal normal.  Tympanic membrane:  Normal, Tympanic membranes normal without swelling or erythema.    NOSE (speculum or scope)  Architecture:  Normal, Grossly normal external nasal architecture with no masses or lesions.  Mucosa:  Normal mucosa, No polyps or masses.  Septum:  Normal, Septum non-obstructing.  Turbinates:  Normal, No turbinate abnormalities    ORAL CAVITY AND OROPHARYNX  Lips:  Normal.  Dental and gingiva:  Normal, No obvious dental or gingival disease.  Mucosa:  Normal, Moist mucous membranes.  Tongue:  Normal, Tongue mobile with no mucosal abnormalities  Hard and soft palate:  Normal, Hard and soft palate without cleft or mucosal lesions.  Oral pharynx:  Normal, Posterior pharynx without lesions or remarkable asymmetry.  Saliva:  Normal, Clear saliva.  Masses:  Normal, No palpable masses or pathologically  enlarged lymph nodes.    NECK  Masses/lymph nodes:  Normal, No worrisome neck masses or lymph nodes.  Salivary glands: 1-2cm mass tail of right parotid glanc. .  Trachea and larynx position:  Normal, Trachea and larynx midline.  Thyroid:  Normal, No thyroid abnormality.  Tenderness:  Normal, No cervical tenderness.  Suppleness:  Normal, Neck supple    NEUROLOGICAL  Speech pattern:  Normal, Proasaic    RESPIRATORY  Symmetry and Respiratory effort:  Normal, Symmetric chest movement and expansion with no increased intercostal retractions or use of accessory muscles.     PET/CT and Pathology images and report reviewed.    IMPRESSION  Patient with lymphoma. More tissue needed.     RECOMMENDATION  I discussed excision of the lesion which may require right superficial parotidectomy. I discussed the procedure, goals and risks. Patient expressed understanding.    Kota Bradley MD

## 2021-06-09 NOTE — PROGRESS NOTES
[facility] Daily Progress     Patient Name: Dejan Zarco  Date: 2020  Visit #:  (3/20 [facility])  Referral Diagnosis: Acute left-sided low back pain without sciatica   Referring provider: Mj Browne MD  Visit Diagnosis:     ICD-10-CM    1. Left-sided low back pain without sciatica, unspecified chronicity  M54.5       Assessment from Initial Evaluation:  Dejan Zarco is a 65 y.o. male who presents to therapy today with chief complaints of (L) low back pain. Onset date of sx was .  Functional impairments include bending, lifting, getting in the car, recreational activities.  Clinical findings include lumbar shift, decreased trunk ROM, decreased hip/LE flexibility, fair abdominal recruitment, decreased segmental mobility of lumbar spine.     Assessment:     HEP/POC compliance is  good .  The patient demonstrates continued improvements in his muscle spasms in his lower back as well as his strength.  He is progressing well and is appropriate to follow up in 2 weeks for likely DC from skilled PT services.    Goal Status:  Pt. will demonstrate/verbalize independence in self-management of condition in : 6 weeks  Pt. will be independent with home exercise program in : 6 weeks  Other:: for household tasks and personal cares without back pain in 4-6 weks.  Patient will return to: sport;exercise;in 6 weeks;Comment  Comment: return to golf and kayaking without pain    Pt will: be able to lift for yard work and household tasks without pain in 6 weeks.      Plan / Patient Education:     Continue with initial plan of care.  Progress with home program as tolerated.   Consider pec stretch and scapular strengthening.  Consider plank.    Subjective:     Pain Ratin  The patient reports that his back is doing well.  He went on a bike ride yesterday and was even tired before then.  He has had a few spasms since last session, but they are quite minor.  He believes his is 90% of his  normal.      Objective:     Mod/major limited ROM lumbar spine all planes.  No pain.    Exercises:  Exercise #1: HS stretch  Comment #1: Supine or seated HEP  Exercise #2: LEEROY  Comment #2: HEP  Exercise #3: SKC  Comment #3: HEP  Exercise #4: abdominal sets  Comment #4: HEP  Exercise #5: supine trunk rot  Comment #5: HEP  Exercise #6: abd sets w/march, leg ext  Comment #6: HEP  Exercise #7: Slump sliders/slump SLR  Comment #7: x 10 each  Exercise #8: Bridging  Comment #8: x 10    Appt time: 10:38AM - 11:03AM    Treatment Today     TREATMENT MINUTES COMMENTS   Evaluation     Self-care/ Home management     Manual therapy 12 R S/L L lumbar rotation mobilizations grades II-IV   Neuromuscular Re-education     Therapeutic Activity     Therapeutic Exercises 13 -Subjective measures taken  -See flow sheet; review of HEP and added bridging to HEP for core and glute strengthening   Gait training     Modality__________________                Total 25    Blank areas are intentional and mean the treatment did not include these items.       Zahida Garcia, PT, DPT  6/26/2020

## 2021-06-09 NOTE — TELEPHONE ENCOUNTER
New Appointment Needed  What is the reason for the visit:    Pre-Op Appt Request  When is the surgery? :  07.28.20  Where is the surgery?:   [facility]   Who is the surgeon? :  Dr. Bradley  What type of surgery is being done?: biopsy- was diagnosed with lymphoma  Provider Preference: PCP or Dr. Almeida  How soon do you need to be seen?: before the procedure  Waitlist offered?: No  Okay to leave a detailed message:  Yes

## 2021-06-09 NOTE — TELEPHONE ENCOUNTER
We've received instruction to get you scheduled for Incisional vs Excisional Biopsy of Parotid Lymphoma (Right) with Dr Bradley. We have that arranged as follows:     Surgery Date: 7/28/2020    Location: [facility]                 3rd Floor, [facility]                  [address] [place] [id number]     Arrival Time: 10:00 AM (unless instructed otherwise by the preop nurse)    Prep:     1. Schedule a preop physical with your primary care doctor. This may be virtual or face-to-face depending on your doctors preference fyi. Call them right away to schedule this.    2. COVID19 testing is required within 72 hours and 48 hours of surgery. A nurse from [facility] will contact you 5 days before surgery to arrange this. If the test result is positive, your surgery will be canceled.    3. Nothing to eat or drink for 8 hours before surgery unless instructed differently by the preop nurse.    4. No blood thinners including aspirin for one week prior to surgery. Verify this is safe for you with your primary care doctor before stopping.     5. You need an adult to drive you home and stay with you 24 hours after surgery. Because of COVID19 related visitor restrictions, your escort cannot accompany you to the center. A nurse will call when you are ready to be picked up.   (Minors will be allowed one guardian to be present during the preoperative phase)    6. When you arrive to the hospital, you will be screened for COVID19 symptoms. If you screen positive, your surgery will need to be postponed for your safety.    7. If the community sees a new surge in COVID19 hospital admissions, your procedure may need to be postponed. We will contact you if this happens.    8. We always encourage you to notify your insurance any time you have something scheduled including surgery. The number is usually right on the back of your insurance card.     Call our office if you  have any questions!     Thank you!   Tiffany   (Direct Line) [phone]  (General Line [phone])

## 2021-06-09 NOTE — PROGRESS NOTES
Preoperative Exam    Scheduled Procedure: Biopsy of right parotid   Surgery Date:  07/28/2020  Surgery Location: [facility], fax [phone]    Surgeon:  Dr. Bardley     Assessment/Plan:     1. Preop general physical exam  No labs indicated, recent labs have been normal. EKG not indicated    2. 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)  Indicated for surgery     Surgical Procedure Risk: Low (reported cardiac risk generally < 1%)  Have you had prior anesthesia?: Yes  Have you or any family members had a previous anesthesia reaction:  No  Do you or any family members have a history of a clotting or bleeding disorder?: Yes: Dad has hemophilia  Cardiac Risk Assessment: no increased risk for major cardiac complications  Revised cardiac risk index: very low risk 0.4 %    APPROVAL GIVEN to proceed with proposed procedure, without further diagnostic evaluation    Please Note: no special considerations    Functional Status: Independent  Patient plans to recover at home with family.     Subjective:      Dejan Zarco is a 65 y.o. male who presents for a preoperative consultation.  Had a fine needle biopsy in May of the parotid gland showed lymphoma. Etiology unclear of type of lymphoma from previous smaller biopsy. This is a larger biopsy with possible removal to determine what type of lymphoma.     All other systems reviewed and are negative, other than those listed in the HPI.    Pertinent History  Do you have difficulty breathing or chest pain after walking up a flight of stairs: No  History of obstructive sleep apnea: No  Steroid use in the last 6 months: No  Frequent Aspirin/NSAID use: Yes: baby aspirin  Prior Blood Transfusion: No  Prior Blood Transfusion Reaction: No  If for some reason prior to, during or after the procedure, if it is medically indicated, would you be willing to have a blood transfusion?:  There is no transfusion refusal.    Current Outpatient Medications    Medication Sig Dispense Refill     aspirin 81 MG EC tablet Take 81 mg by mouth daily.       ascorbic acid, vitamin C, (VITAMIN C) 1000 MG tablet Take 1,000 mg by mouth as needed.       cetirizine (ZYRTEC) 10 MG tablet Take 10 mg by mouth daily.       cholecalciferol, vitamin D3, (VITAMIN D3) 50 mcg (2,000 unit) Tab Take by mouth as needed.       cyclobenzaprine (FLEXERIL) 5 MG tablet Take 1-2 tablets (5-10 mg total) by mouth 3 (three) times a day as needed for muscle spasms. 30 tablet 1     ibuprofen (ADVIL) 200 MG tablet Take 200 mg by mouth every 6 (six) hours as needed for pain.       naproxen sodium (ALEVE) 220 MG tablet Take 220 mg by mouth every 12 (twelve) hours as needed for pain.       zinc gluconate 30 mg Tab Take by mouth as needed.       No current facility-administered medications for this visit.         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    Past Surgical History:   Procedure Laterality Date     CYST REMOVAL      Scalp     US BIOPSY FINE NEEDLE ASPIRATION LYMPH NODE (BREAST) LEFT Left 5/12/2020     US BIOPSY FINE NEEDLE ASPIRATION LYMPH NODE (BREAST) LEFT Left 5/12/2020       Social History     Socioeconomic History     Marital status:      Spouse name: Not on file     Number of children: Not on file     Years of education: Not on file     Highest education level: Not on file   Occupational History     Not on file   Social Needs     Financial resource strain: Not on file     Food insecurity     Worry: Not on file     Inability: Not on file     Transportation needs     Medical: Not on file     Non-medical: Not on file   Tobacco Use     Smoking status: Never Smoker     Smokeless tobacco: Never Used   Substance and Sexual Activity      Alcohol use: Yes     Comment: 1-2/week     Drug use: No     Sexual activity: Not on file   Lifestyle     Physical activity     Days per week: Not on file     Minutes per session: Not on file     Stress: Not on file   Relationships     Social connections     Talks on phone: Not on file     Gets together: Not on file     Attends Zoroastrianism service: Not on file     Active member of club or organization: Not on file     Attends meetings of clubs or organizations: Not on file     Relationship status: Not on file     Intimate partner violence     Fear of current or ex partner: Not on file     Emotionally abused: Not on file     Physically abused: Not on file     Forced sexual activity: Not on file   Other Topics Concern     Not on file   Social History Narrative     Not on file       Patient Care Team:  Ned Kwong MD as PCP - General  Ned Kwong MD as Assigned PCP  Tamika Torres MD as Physician (Hematology and Oncology)  Berna Clark CNP as Nurse Practitioner (Hematology and Oncology)  Stephani Cisse RN as Oncology Nurse Navigator (Hematology and Oncology)          Objective:     Vitals:    07/15/20 1526   BP: 132/80   Pulse: (!) 103   SpO2: 95%   Weight: 196 lb 12.8 oz (89.3 kg)   Height: 6' (1.829 m)         Physical Exam:  General Appearance:  Alert, cooperative, no distress, appears stated age   Head:  Normocephalic, without obvious abnormality, atraumatic   Eyes:  PERRL, conjunctiva/corneas clear, EOM's intact   Ears:  Normal TM's and external ear canals, both ears   Nose: Nares normal, septum midline, mucosa normal, no drainage   Throat: Lips, mucosa, and tongue normal; teeth and gums normal   Neck: Supple, symmetrical, trachea midline, no adenopathy, thyroid: not enlarged, symmetric, no tenderness/mass/nodules   Back:   Symmetric, no curvature, ROM normal, no CVA tenderness   Lungs:   Clear to auscultation bilaterally, respirations unlabored   Chest Wall:  No  tenderness or deformity   Heart:  Regular rate and rhythm, S1, S2 normal, no murmur, rub or gallop   Abdomen:   Soft, non-tender, bowel sounds active all four quadrants,  no masses, no organomegaly   Extremities: Extremities normal, atraumatic, no cyanosis or edema   Skin: Skin color, texture, turgor normal, no rashes or lesions   Lymph nodes: Has a medium enlarged hard lymph node at the right mandible bone.    Neurologic: Normal,Coordinated, smooth gait, balance, rapid alternating movements, sensory functioning, and cranial nerves II-XII grossly intact. DTR's+2 bilaterally brachioradialis, knee, ankle.            There are no Patient Instructions on file for this visit.    EKG:  n/a    Labs:  No labs were ordered during this visit    Immunization History   Administered Date(s) Administered     INFLUENZA,RECOMBINANT,INJ,PF QUADRIVALENT 18+YRS 11/28/2018, 10/25/2019     Influenza, inj, historic,unspecified 10/20/2016     Influenza,seasonal,quad inj =/> 6months 10/20/2016, 11/22/2017     Td, adult adsorbed, PF 02/13/2019     ZOSTER, LIVE 10/20/2016     ZOSTER, RECOMBINANT, IM 10/25/2019, 03/12/2020           Electronically signed by Coleen Browne CNP 07/15/20 3:25 PM

## 2021-06-09 NOTE — PROGRESS NOTES
HPI: Pt is here for follow up of a re excisional bx left auricle. .   he is doing well.  Pain is well controlled.  No difficulties with the surgical wound/wounds.  he is eating well and denies fever and chills.         /82 (Patient Site: Left Arm, Patient Position: Sitting, Cuff Size: Adult Large)  Pulse 83  SpO2 97%    EXAM:  GENERAL:Appears well  Wound healing perfectly. Removed four interrupted stiches.   Reviewed benign results to patient      Assessment/Plan: . Doing well after surgery and should follow up as needed.  Ramos Caban PA-C  [facility] Department of Surgery

## 2021-06-09 NOTE — TELEPHONE ENCOUNTER
I dont have any same days until the 27th which we could do but otherwise virtual visit with hubert or myself sooner? Will need ecg which we could do at separate appt

## 2021-06-10 NOTE — CONSULTS
Consults   [facility] Radiation Oncology Consult Note     Patient: Dejan Zarco  MRN: [id number]  Date of Service: 08/20/2020          Tamika Torres MD  [address] [place] [address]       Dear Dr. Torres:    Thank you very much for referring this patient for consideration of radiotherapy. As you know Mr. Zarco is a 65 y.o. 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  The patient is referred to radiation oncology for evaluation and consideration of definitive radiation therapy.    HISTORY OF PRESENT ILLNESS:   Mr. Zarco is a 65 y.o. male who has been in his usual state of good health until recently. He noticed a mass in the right parotid gland area starting in February which was slowly growing in size.  Eventually had a visit with his primary and then an ultrasound and biopsy.  The ultrasound study on 5/5/2020 showed 2 lesions involving the right parotid gland measuring 1.5 and 0.9 cm. Pathology initially was read as atypical lymphoid infiltrate but based on additional studies is felt to be consistent with diffuse B large cell lymphoma. Patient has noted some mild fatigue. No fever chills or sweats at night. Some throat discomfort. The PET CT scan on 5/28/2020 showed a 8 mm lesion in the right superficial parotid gland with SUV max 5.2 consistent with lymphoma.  There is no radiographic evidence of other metastatic disease.  There is also FDG avid thyroid nodules. The patient then underwent excisional biopsy of the right parotid mass on 7/28/2020.  The pathology confirmed marginal zone lymphoma.  Bone marrow biopsy was negative.  Patient had a follow-up PET CT scan on 8/13/2020 which showed no FDG avid abnormal activity in the parotid gland.  There is persistent thyroid gland nodule.  This was recommended to have repeat study in 1  year.  His case has been reviewed at tumor conference and the consensus recommendation is to consider involved site radiation therapy. The patient is referred to radiation oncology for evaluation and consideration of possible definitive radiation therapy for his right parotid gland lymphoma.    CHEMOTHERAPY HISTORY: Concurrent Chemotherapy: No    RADIATION THERAPY HISTORY: Prior Radiation: No    IMPLANTED CARDIAC DEVICE: none    Current Outpatient Medications   Medication Sig Dispense Refill     ascorbic acid, vitamin C, (VITAMIN C) 1000 MG tablet Take 1,000 mg by mouth as needed.       aspirin 81 MG EC tablet Take 81 mg by mouth daily.       cetirizine (ZYRTEC) 10 MG tablet Take 10 mg by mouth daily.       cholecalciferol, vitamin D3, (VITAMIN D3) 50 mcg (2,000 unit) Tab Take by mouth as needed.       ibuprofen (ADVIL) 200 MG tablet Take 200 mg by mouth every 6 (six) hours as needed for pain.       zinc gluconate 30 mg Tab Take by mouth as needed.       No current facility-administered medications for this visit.      Past Medical History:   Diagnosis Date     Lymphoma (H)      Past Surgical History:   Procedure Laterality Date     CYST REMOVAL      Scalp     PAROTIDECTOMY Right 7/28/2020    Procedure: excisional biopsy of right parotid lymphoma, 23 HOUR;  Surgeon: Kota Bradley MD;  Location: [facility];  Service: ENT     scalp surgery      lesion removal behind ear and cyst top of head      US BIOPSY FINE NEEDLE ASPIRATION LYMPH NODE (BREAST) LEFT Left 5/12/2020     US BIOPSY FINE NEEDLE ASPIRATION LYMPH NODE (BREAST) LEFT Left 5/12/2020     Patient has no known allergies.  Family History   Problem Relation Age of Onset     Ovarian cancer Mother 76     Heart disease Father      Stroke Father      No Medical Problems Sister      Heart disease Brother      Diabetes Brother      No Medical Problems Brother      Social History     Socioeconomic History     Marital status:      Spouse name: Not on file      Number of children: Not on file     Years of education: Not on file     Highest education level: Not on file   Occupational History     Not on file   Social Needs     Financial resource strain: Not on file     Food insecurity     Worry: Not on file     Inability: Not on file     Transportation needs     Medical: Not on file     Non-medical: Not on file   Tobacco Use     Smoking status: Never Smoker     Smokeless tobacco: Never Used   Substance and Sexual Activity     Alcohol use: Yes     Comment: 1-2/week     Drug use: No     Sexual activity: Never   Lifestyle     Physical activity     Days per week: Not on file     Minutes per session: Not on file     Stress: Not on file   Relationships     Social connections     Talks on phone: Not on file     Gets together: Not on file     Attends Islam service: Not on file     Active member of club or organization: Not on file     Attends meetings of clubs or organizations: Not on file     Relationship status: Not on file     Intimate partner violence     Fear of current or ex partner: Not on file     Emotionally abused: Not on file     Physically abused: Not on file     Forced sexual activity: Not on file   Other Topics Concern     Not on file   Social History Narrative     Not on file        Review of Systems:      General  Constitutional (WDL): All constitutional elements are within defined limits  EENT  Eye Disorder (WDL): All eye disorder elements are within defined limits  Ear Disorder (WDL): All ear disorder elements are within defined limits  Respiratory   Respiratory (WDL): Within Defined Limits  Cardiovascular  Cardiovascular (WDL): All cardiovascular elements are within defined limits  Endocrine     Gastrointestinal  Gastrointestinal (WDL): All gastrointestinal elements are within defined limits  Musculoskeletal  Musculoskeletal and Connetive Tissue Disorders (WDL): All Musculoskeletal and Connetive Tissue Disorder elements are within defined  limits  Integumentary               Integumentary (WDL): All integumentary elements are within defined limits  Neurological  Neurosensory (WDL): All neurosensory elements are within defined limits  Psychological/Emotional   Patient Coping: Accepting;Open/discussion  Hematological/Lymphatic  Lymph (WDL): All lymph disorder elements are within defined limits  Lymph Node Discomfort: No  Dermatologic     Genitourinary/Reproductive  Genitourinary (WDL): All genitourinary elements are within defined limits  Reproductive     Pain              Currently in Pain: No/denies  Accompanied by  Accompanied by: Alone    Imaging: Reviewed    Pathology: Reviewed    ECOG Peformance Status  ECOG Performance Status: 0  Distress Assessment Score: No distress    Objective:      PHYSICAL EXAMINATION:    Wt 196 lb 14.4 oz (89.3 kg)   BMI 26.70 kg/m      Gen: Alert, in NAD  Eyes: PERRL, EOMI, sclera anicteric  HENT     Head: NC/AT     Ears: No external auricular lesions     Nose/sinus: No rhinorrhea or epistaxis     Oropharynx: MMM, no visible oral lesions  Neck: Supple, full ROM, no LAD.  There is however a well-healed surgical scar in the right neck consistent with a recent history of surgery.  Pulm: No wheezing, stridor or respiratory distress  CV: Well-perfused, no cyanosis, no pedal edema  Abdominal: BS+, soft, nontender, nondistended, no hepatomegaly  Back: No step-offs or pain to palpation along the thoracolumbar spine  Rectal: Deferred  : Deferred  Musculoskeletal: Normal muscle bulk and tone  Skin: Normal color and turgor  Neurologic: A/Ox3, CN II-XII intact, normal gait and station  Psychiatric: Appropriate mood and affect    Intent of Therapy: Curative  Side effects that may occur during or within weeks after Radiation Therapy      Fatigue and general weakness    Loss of hair on the face and neck    Pain in the irradiated limb    Darkening, irritation, itchiness, redness, dryness,and peeling, scabbing and ulceration of the  skin on the neck and face    Mouth and throat dryness, irritation and swallowing difficulties and infection    Thickening of the saliva    Change in or loss of taste sensation    Decrease in appetite    Hoarseness and change in voice    Side effects that may occur months or years after Radiation Therapy      Development of another tumor or cancer    Thickening, telangiectasias (development of spider like blood vessels in the skin) and ulceration of the skin of the face and neck    Fibrosis (scar tissue), decreased flexibility and swelling of the neck    Brain inflammation or necrosis that may cause various neurologic symptoms    Decrease in memory and thinking abilities    Poor healing after a trauma or surgery in the irradiated area    Facial numbness, pain and weakness    Nerve damage resulting in loss of strength and sensation    Tooth and jaw decay and poor healing after dental procedures    The risks, benefits and alternatives to radiation therapy were outlined with the patient. All questions were answered and a consent was signed.     Impression     Marginal zone lymphoma involving right parotid gland, clinical stage I, status post excisional biopsy and staging work-up indicating no evidence of metastatic disease.     Assessment & Plan:     I have personally reviewed his upcoming medical record today.  I have also reviewed his most recent radiology study including PET CT scan.  This is 65-year-old gentleman with a new diagnosis of stage I marginal zone lymphoma involving right parotid gland with no evidence of metastatic disease.  The possible treatment options including surgery, systemic therapy, and radiation therapy has been discussed with the patient in detail and at the great lengths.  The possible risks and side effects of radiation therapy has also been explained to the patient.  Questions are answered to patient satisfaction.  His case has been discussed at tumor conference and the consensus  recommendation is to consider involved site radiation therapy.  Therefore radiation therapy is offered to the patient and patient is willing to proceed being aware of potential risks and side effect involved.  He is scheduled to return to radiation oncology later on today for simulation.  I plan to give radiation therapy to a total dose of 2400 cGy in 12 treatments targeted to the right parotid gland/neck region only.    Again, thank you very much for the referral and allowing me to participate in the care of this patient.  If you have any questions or concerns about this consultation, please do not hesitate to call.  I spent approximately 45 minutes today with the patient and 80% time was used for counseling.      Sincerely,        Doris Priest MD, PhD  Department of Radiation Oncology   [facility]  Tel: [phone]  Page: [phone]    [facility]  [address] [place] [address]     [address] [place] [address]    CC:  Patient Care Team:  Ned Kwong MD as PCP - General  Ned Kwong MD as Assigned PCP  Tamika Torres MD as Physician (Hematology and Oncology)  Berna Clark CNP as Nurse Practitioner (Hematology and Oncology)  Stephani Cisse, RN as Oncology Nurse Navigator (Hematology and Oncology)  Doris Priest MD as Physician (Radiation Oncology)

## 2021-06-10 NOTE — TELEPHONE ENCOUNTER
Harry left a message with a few questions on Friday. I called him back and he stated that his questions were answered by the Pre Op Nurse who had just called him for surgery.

## 2021-06-10 NOTE — TELEPHONE ENCOUNTER
Called to give path report to patient. Shows a marginal zone lymphoma. He is scheduled to see Dr. Torres tomorrow to go over treatment options.

## 2021-06-10 NOTE — PATIENT INSTRUCTIONS - HE
Patient Education     Rituximab Solution for injection  What is this medicine?  RITUXIMAB (ri TUX i mab) is a monoclonal antibody. This medicine changes the way the body's immune system works. It is used commonly to treat non-Hodgkin's lymphoma and other conditions. In cancer cells, this drug targets a specific protein within cancer cells and stops the cancer cells from growing. It is also used to treat rhuematoid arthritis (RA). In RA, this medicine slow the inflammatory process and help reduce joint pain and swelling. This medicine is often used with other cancer or arthritis medications.  This medicine may be used for other purposes; ask your health care provider or pharmacist if you have questions.  What should I tell my health care provider before I take this medicine?  They need to know if you have any of these conditions:    blood disorders    heart disease    history of hepatitis B    infection (especially a virus infection such as chickenpox, cold sores, or herpes)    irregular heartbeat    kidney disease    lung or breathing disease, like asthma    lupus    an unusual or allergic reaction to rituximab, mouse proteins, other medicines, foods, dyes, or preservatives    pregnant or trying to get pregnant    breast-feeding  How should I use this medicine?  This medicine is for infusion into a vein. It is administered in a hospital or clinic by a specially trained health care professional.  A special MedGuide will be given to you by the pharmacist with each prescription and refill. Be sure to read this information carefully each time.  Talk to your pediatrician regarding the use of this medicine in children. This medicine is not approved for use in children.  Overdosage: If you think you have taken too much of this medicine contact a poison control center or emergency room at once.  NOTE: This medicine is only for you. Do not share this medicine with others.  What if I miss a dose?  It is important not to  miss a dose. Call your doctor or health care professional if you are unable to keep an appointment.  What may interact with this medicine?    cisplatin    medicines for blood pressure    some other medicines for arthritis    vaccines  This list may not describe all possible interactions. Give your health care provider a list of all the medicines, herbs, non-prescription drugs, or dietary supplements you use. Also tell them if you smoke, drink alcohol, or use illegal drugs. Some items may interact with your medicine.  What should I watch for while using this medicine?  Report any side effects that you notice during your treatment right away, such as changes in your breathing, fever, chills, dizziness or lightheadedness. These effects are more common with the first dose.  Visit your prescriber or health care professional for checks on your progress. You will need to have regular blood work. Report any other side effects. The side effects of this medicine can continue after you finish your treatment. Continue your course of treatment even though you feel ill unless your doctor tells you to stop.  Call your doctor or health care professional for advice if you get a fever, chills or sore throat, or other symptoms of a cold or flu. Do not treat yourself. This drug decreases your body's ability to fight infections. Try to avoid being around people who are sick.  This medicine may increase your risk to bruise or bleed. Call your doctor or health care professional if you notice any unusual bleeding.  Be careful brushing and flossing your teeth or using a toothpick because you may get an infection or bleed more easily. If you have any dental work done, tell your dentist you are receiving this medicine.  Avoid taking products that contain aspirin, acetaminophen, ibuprofen, naproxen, or ketoprofen unless instructed by your doctor. These medicines may hide a fever.  Do not become pregnant while taking this medicine. Women should  inform their doctor if they wish to become pregnant or think they might be pregnant. There is a potential for serious side effects to an unborn child. Talk to your health care professional or pharmacist for more information. Do not breast-feed an infant while taking this medicine.  What side effects may I notice from receiving this medicine?  Side effects that you should report to your doctor or health care professional as soon as possible:    allergic reactions like skin rash, itching or hives, swelling of the face, lips, or tongue    low blood counts - this medicine may decrease the number of white blood cells, red blood cells and platelets. You may be at increased risk for infections and bleeding.    signs of infection - fever or chills, cough, sore throat, pain or difficulty passing urine    signs of decreased platelets or bleeding - bruising, pinpoint red spots on the skin, black, tarry stools, blood in the urine    signs of decreased red blood cells - unusually weak or tired, fainting spells, lightheadedness    breathing problems    confused, not responsive    chest pain    fast, irregular heartbeat    feeling faint or lightheaded, falls    mouth sores    redness, blistering, peeling or loosening of the skin, including inside the mouth    stomach pain    swelling of the ankles, feet, or hands    trouble passing urine or change in the amount of urine  Side effects that usually do not require medical attention (report to your doctor or other health care professional if they continue or are bothersome):    anxiety    headache    loss of appetite    muscle aches    nausea    night sweats  This list may not describe all possible side effects. Call your doctor for medical advice about side effects. You may report side effects to FDA at 4-631-FDA-7738.  Where should I keep my medicine?  This drug is given in a hospital or clinic and will not be stored at home.  NOTE:This sheet is a summary. It may not cover all  possible information. If you have questions about this medicine, talk to your doctor, pharmacist, or health care provider. Copyright  2015 [employer]

## 2021-06-10 NOTE — PROGRESS NOTES
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  Thyroid nodule, plan for follow-up ultrasound through his primary in a year  Fatigue    Bone marrow shows no evidence of lymphoma.  PET scan shows no activity in the parotid gland.    Based on this he appears to have stage I lymphoma, marginal zone type involving the right parotid gland.    Recommend consideration for radiation therapy as there may be still risk for relapse and radiation will increase likelihood of cure.  Will refer to radiation oncology for evaluation.    Reviewed follow-up after radiation.  We will see patients every 3 months the first year and every 6 months after that.  Will consider annual imaging and periodic lab work.    Discussed symptoms of relapse.  Most likely enlargement in the area of the right parotid would be the first symptom.  Other lymph node enlargement in the neck is possible.  Systemic symptoms of fevers, night sweats and unexplained weight loss would be less likely.    PET scan does show activity in the thyroid.  Patient does have previously known thyroid nodule.  Should have repeat ultrasound in about a year.    Continues to report fatigue.  Do not think that this is likely related to the lymphoma.  Explained that fatigue could increase with radiation therapy which should resolve a couple of months after the end of treatment.  If fatigue persists and he is concerned he should follow-up with his primary physician.    Patient and his wife's questions answered.    Plan: Refer to radiation oncology for  consideration of radiation therapy for this stage I low-grade lymphoma  Follow-up in 4 months      Measurable disease: PET scan      ECOG Performance   ECOG Performance Status: 0    Distress Assessment  Distress Assessment Score: No distress    Pain  Pain Score (Initial OR Reassessment): 0    Problem List    1. 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)  Ambulatory referral to Radiation Therapy        CC: Ned Kwong MD    ______________________________________________________________________________    History of Present Illness    Mr. Dejan Zarco returns for reevaluation.  Seen 2 weeks ago.  He is undergone bone marrow aspirate and biopsy and PET scan and is here to review results.  His most recent pathology was sent to the [facility] for evaluation but the results are pending.    Patient overall is feeling fine.  Continues to complain of Kvng.  He is able to bike 30 miles but feels that his abilities have diminished for the last for 5 months.  He is otherwise feeling fine.      August 7, 2020:  Mr. Dejan Zarco is here for follow-up.  Last seen in early June.  [place] review of his pathology confirmed lymphoma but type was unclear.  He was recommended excisional biopsy about 2 weeks ago.  The superficial parotid tumor was excised but the deep was not.  Patient has recovered fine from the surgery.  Denies any fever chills or sweats.  ECOG status is 0.          June 2020:      Right neck mass has not changed.  Reports fatigue but he is very active and biked 30 miles yesterday and also worked in his yard.  No other new symptoms or problems.    Pain Status  Currently in Pain: No/denies    Review of Systems    Constitutional  Constitutional (WDL): All constitutional elements are within defined limits  Neurosensory  Neurosensory (WDL): All neurosensory elements are within defined limits  Cardiovascular  Cardiovascular (WDL): All cardiovascular elements are within defined  limits  Pulmonary  Respiratory (WDL): Within Defined Limits  Gastrointestinal  Gastrointestinal (WDL): All gastrointestinal elements are within defined limits  Genitourinary  Genitourinary (WDL): All genitourinary elements are within defined limits  Integumentary  Integumentary (WDL): All integumentary elements are within defined limits  Patient Coping  Patient Coping: Open/discussion  Distress Assessment  Distress Assessment Score: No distress  Accompanied by  Accompanied by: Alone    Past History  History reviewed. No pertinent past medical history.      Past Surgical History:   Procedure Laterality Date     CYST REMOVAL      Scalp     PAROTIDECTOMY Right 7/28/2020    Procedure: excisional biopsy of right parotid lymphoma, 23 HOUR;  Surgeon: Kota Bradley MD;  Location: [facility];  Service: ENT     scalp surgery      lesion removal behind ear and cyst top of head      US BIOPSY FINE NEEDLE ASPIRATION LYMPH NODE (BREAST) LEFT Left 5/12/2020     US BIOPSY FINE NEEDLE ASPIRATION LYMPH NODE (BREAST) LEFT Left 5/12/2020       Physical Exam    Recent Vitals 8/17/2020   Height -   Weight -   BSA (m2) -   BP -   Pulse -   Temp 97.8   Temp src 1   SpO2 -   Some recent data might be hidden       GENERAL: Alert and oriented to time place and person. Seated comfortably. In no distress.    HEAD: Atraumatic and normocephalic.    EYES: LOUIE, EOMI.  No pallor.  No icterus.    Oral cavity: no mucosal lesion or tonsillar enlargement.    NECK: supple. JVP normal.  No thyroid enlargement.    LYMPH NODES: No palpable, cervical, axillary or inguinal lymphadenopathy.    CHEST: clear to auscultation bilaterally.  Resonant to percussion throughout bilaterally.  Symmetrical breath movements bilaterally.    CVS: S1 and S2 are heard. Regular rate and rhythm.  No murmur or gallop or rub heard.  No peripheral edema.    ABDOMEN: Soft. Not tender. Not distended.  No palpable hepatomegaly or splenomegaly.  No other mass palpable.  Bowel  sounds heard.    EXTREMITIES: Warm.    SKIN: no rash, or bruising or purpura.  Has a full head of hair.      Lab Results    Recent Results (from the past 168 hour(s))   Bone Marrow Biopsy and Exam   Result Value Ref Range    Case Report       Bone Marrow                                       Case: [id number]                                   Authorizing Provider:  Tamika Torres       Collected:           08/10/2020 1138                                     MD Peyton                                                                 Ordering Location:     [facility] and Received:            08/10/2020 1200                                     Hematology                                                                   Pathologist:           Manohar Garcia MD                                                        Specimens:   A) - Iliac Crest, Left, Bone marrow biopsy and aspirate from left iliac crest                       B) - Iliac Crest, Left                                                                              C) - Iliac Crest, Left                                                                              D) - Peripheral Blood                                                                      Final Diagnosis       BONE MARROW, POSTERIOR ILIAC CREST, ASPIRATE, CLOT SECTION, AND BIOPSY:    - NORMOCELLULAR MARROW WITH TRILINEAGE HEMATOPOIESIS    - ADEQUATE IRON STORES     - PENDING FLOW CYTOMETRY ([id number])     PERIPHERAL BLOOD:     - MILD NORMOCHROMIC-NORMOCYTIC ANEMIA    Comment       The history of lymphoma is reviewed. Morphology and immunoarchitecture are negative for involvement by lymphoma, pending flow cytometry and Cytogenetic studies.    Clinical Information MZ Lymphoma     Performed by: Gregory Yaeger MD     Peripheral Smear       Red blood cells are low normal in number and overall normochromic and normocytic. Anisopoikilocytosis, polychromasia, and rouleaux formation are  not prominent.    The white blood cell count and differential appear as reported on the CBC. Leukocytes are normal in number and appearance, consisting predominantly of segmented and band neutrophils with fewer numbers of lymphocytes and monocytes. No blasts or dysplastic changes are identified.    Platelets are normal in number and appearance.    Bone Marrow Differential       Neutrophils and precursors 57%; Erythroid cells 26%; Lymphocytes 12%; Monocytes 1%; Eosinophils 2%; Basophils 0%; Plasma cells 0%; Blasts 2%.    M:E Ratio: 2-3:1    Aspirate Smear       Aspirate smears are cellular and demonstrate numerous marrow particles. Myeloid and erythroid cells are present and demonstrate full spectrum maturation. Blasts are not increased. Megakaryocytes are normal in number and appearance. An iron stain demonstrates adequate iron stores with no increase in ring sideroblasts.    Core Biopsy/Aspirate Clot       Bone marrow biopsy and clot sections are normocellular at 20-30%. Myeloid and erythroid cells are present and demonstrate full spectrum maturation. Blasts are not increased. Megakaryocytes are normal in number and appearance. An iron stain demonstrates no significant iron stores (no significant marrow particles on iron slide).     Scattered CD20(+) B-cells are noted; however, kappa and lambda in-situ hybridization studies demonstrate no definitive evidence of clonality. CD3 and Bcl-2 highlight T-cells, unremarkable.     All controls stain appropriately.    Special Stains       MARK  FDA Disclaimer:    The In-Situ Hybridization test/s was/were developed and the performance characteristics determined by [employer]. It has not been cleared or approved by the US Food and Drug Administration.    The Food and Drug Administration has determined that clearance or approval is not necessary, because this test is used for clinical purposes. This test should not be regarded as investigational or  research.    [email or web address] is certified for the performance of high-complexity clinical testing under the Clinical Laboratory Improvement Amendments of 1988 (CLIA), and in keeping with the certification requirements, [email or web address] has verified this test's accuracy and precision or validity of the method. Additional information is available upon request.    Charges       When performed, bone marrow core biopsies are decalcified prior to tissue processing.    CPT: 13772, 66839, 48288, 88305 x2, 88313 x2, 88342 x2, 88341 x3, 44173, 69010, 65705  ICD-10: C85.90    Result Flag     HM1 (CBC with Diff)   Result Value Ref Range    WBC 5.5 4.0 - 11.0 thou/uL    RBC 4.72 4.40 - 6.20 mill/uL    Hemoglobin 13.7 (L) 14.0 - 18.0 g/dL    Hematocrit 42.3 40.0 - 54.0 %    MCV 90 80 - 100 fL    MCH 29.0 27.0 - 34.0 pg    MCHC 32.4 32.0 - 36.0 g/dL    RDW 12.7 11.0 - 14.5 %    Platelets 184 140 - 440 thou/uL    MPV 9.9 8.5 - 12.5 fL    Neutrophils % 59 50 - 70 %    Lymphocytes % 28 20 - 40 %    Monocytes % 11 (H) 2 - 10 %    Eosinophils % 2 0 - 6 %    Basophils % 0 0 - 2 %    Neutrophils Absolute 3.2 2.0 - 7.7 thou/uL    Lymphocytes Absolute 1.5 0.8 - 4.4 thou/uL    Monocytes Absolute 0.6 0.0 - 0.9 thou/uL    Eosinophils Absolute 0.1 0.0 - 0.4 thou/uL    Basophils Absolute 0.0 0.0 - 0.2 thou/uL   POCT Glucose    Specimen: Capillary; Blood   Result Value Ref Range    Glucose 93 70 - 139 mg/dL       Imaging    Nm Pet Ct Skull To Mid Thigh    Result Date: 8/13/2020  EXAM: NM PET CT SKULL TO MID THIGH LOCATION: [facility] DATE/TIME: 8/13/2020 2:41 PM INDICATION: Subsequent treatment planning and restaging for diffuse large B-cell lymphoma, extranodal and solid organs sites. Right parotid gland lymphoma status post excision in July 2020. COMPARISON: FDG PET/CT dated 05/28/2020 TECHNIQUE: Serum glucose level 93 mg/dL. One hour post intravenous administration of 8.0 mCi F-18 FDG, PET imaging was performed from  the skull base to the mid thighs utilizing attenuation correction with concurrent axial CT and PET/CT image fusion. Dose  reduction techniques were used. FINDINGS: Redemonstrated FDG avid left thyroid nodule in the posterior mid thyroid lobe measuring approximately 1.2 x 1.6 cm (max SUV 6.2, previously 5.8), a third of which represent primary thyroid neoplasms. The remaining FDG uptake is physiologic from  the skull base to mid thigh, specifically there is been excision of the right parotid gland nodule. Mild senescent intracranial changes. Punctate nonobstructing left renal calculus. Small fat-containing umbilical hernia. Sigmoid diverticula. Pelvic phleboliths. Multilevel degenerative changes in spine.     1. Complete response to right parotid gland lymphoma excision. 2. Redemonstrated FDG avid left thyroid nodule, third of which represent primary thyroid neoplasms. This is amenable to ultrasound-guided histologic sampling if desired.        Signed by: Tamika Torres MD

## 2021-06-10 NOTE — OP NOTE
Otolaryngology Full Operative Report    Date of Operation:  7/28/20    Pre-operative Diagnosis:  Right parotid mass  Post-operative Diagnosis:  Same  Procedure(s):  Excisional biopsy of right parotid ass    Surgeon: Kota Bradley MD  Assistant(s):  Kristen Phipps MD  Anesthesia:  General    Procedure in Detail:  The patient was brought to the OR. After induction of general anesthesia, the patient was orotracheally intubated. The patient was then prepped and draped in the normal fashion for the procedure. The lesion was palpable. Approximately 9cc of lidocaine with epinephrine solution was infiltrated in the skin overlying the parotid. A limited parotid incision was used initially as the lesion appeared to be superficial. A 15 blade was used to incise the skin. A limited parotid flap was raised over the area in questions. Lonestar retractors were place to retract the skin. The mass was meticulously dissected from the underlying parotid gland. There was a palpable mass deeper within the gland. The point of the surgery was to get adequate tissue for typing, so after removal of the more superficial mass, I sent it to pathology to be sure that this was adequate. Pathology confirmed that the mass removed was consistent with lymphoma. At the conclusion of the procedure, hemostasis was obtained with bipolar cautery. The wound was closed with 4-0 monocryl suture followed by tissue glue on the skin. The patient was then returned to anesthesia.     Findings:  Right parotid mass    Specimen(s):  Sent to pathology for touch prep.    EBL:  Minimal    Complications:  None    Disposition: The patient was extubated in the operating room and was transferred to the PACU in stable condition.     Kota Bradley MD

## 2021-06-10 NOTE — H&P (VIEW-ONLY)
Preoperative Exam    Scheduled Procedure: Biopsy of right parotid   Surgery Date:  07/28/2020  Surgery Location: [facility], fax [phone]    Surgeon:  Dr. Bradley     Assessment/Plan:     1. Preop general physical exam  No labs indicated, recent labs have been normal. EKG not indicated    2. 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)  Indicated for surgery     Surgical Procedure Risk: Low (reported cardiac risk generally < 1%)  Have you had prior anesthesia?: Yes  Have you or any family members had a previous anesthesia reaction:  No  Do you or any family members have a history of a clotting or bleeding disorder?: Yes: Dad has hemophilia  Cardiac Risk Assessment: no increased risk for major cardiac complications  Revised cardiac risk index: very low risk 0.4 %    APPROVAL GIVEN to proceed with proposed procedure, without further diagnostic evaluation    Please Note: no special considerations    Functional Status: Independent  Patient plans to recover at home with family.     Subjective:      Dejan Zarco is a 65 y.o. male who presents for a preoperative consultation.  Had a fine needle biopsy in May of the parotid gland showed lymphoma. Etiology unclear of type of lymphoma from previous smaller biopsy. This is a larger biopsy with possible removal to determine what type of lymphoma.     All other systems reviewed and are negative, other than those listed in the HPI.    Pertinent History  Do you have difficulty breathing or chest pain after walking up a flight of stairs: No  History of obstructive sleep apnea: No  Steroid use in the last 6 months: No  Frequent Aspirin/NSAID use: Yes: baby aspirin  Prior Blood Transfusion: No  Prior Blood Transfusion Reaction: No  If for some reason prior to, during or after the procedure, if it is medically indicated, would you be willing to have a blood transfusion?:  There is no transfusion refusal.    Current Outpatient Medications    Medication Sig Dispense Refill     aspirin 81 MG EC tablet Take 81 mg by mouth daily.       ascorbic acid, vitamin C, (VITAMIN C) 1000 MG tablet Take 1,000 mg by mouth as needed.       cetirizine (ZYRTEC) 10 MG tablet Take 10 mg by mouth daily.       cholecalciferol, vitamin D3, (VITAMIN D3) 50 mcg (2,000 unit) Tab Take by mouth as needed.       cyclobenzaprine (FLEXERIL) 5 MG tablet Take 1-2 tablets (5-10 mg total) by mouth 3 (three) times a day as needed for muscle spasms. 30 tablet 1     ibuprofen (ADVIL) 200 MG tablet Take 200 mg by mouth every 6 (six) hours as needed for pain.       naproxen sodium (ALEVE) 220 MG tablet Take 220 mg by mouth every 12 (twelve) hours as needed for pain.       zinc gluconate 30 mg Tab Take by mouth as needed.       No current facility-administered medications for this visit.         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    Past Surgical History:   Procedure Laterality Date     CYST REMOVAL      Scalp     US BIOPSY FINE NEEDLE ASPIRATION LYMPH NODE (BREAST) LEFT Left 5/12/2020     US BIOPSY FINE NEEDLE ASPIRATION LYMPH NODE (BREAST) LEFT Left 5/12/2020       Social History     Socioeconomic History     Marital status:      Spouse name: Not on file     Number of children: Not on file     Years of education: Not on file     Highest education level: Not on file   Occupational History     Not on file   Social Needs     Financial resource strain: Not on file     Food insecurity     Worry: Not on file     Inability: Not on file     Transportation needs     Medical: Not on file     Non-medical: Not on file   Tobacco Use     Smoking status: Never Smoker     Smokeless tobacco: Never Used   Substance and Sexual Activity      Alcohol use: Yes     Comment: 1-2/week     Drug use: No     Sexual activity: Not on file   Lifestyle     Physical activity     Days per week: Not on file     Minutes per session: Not on file     Stress: Not on file   Relationships     Social connections     Talks on phone: Not on file     Gets together: Not on file     Attends Oriental orthodox service: Not on file     Active member of club or organization: Not on file     Attends meetings of clubs or organizations: Not on file     Relationship status: Not on file     Intimate partner violence     Fear of current or ex partner: Not on file     Emotionally abused: Not on file     Physically abused: Not on file     Forced sexual activity: Not on file   Other Topics Concern     Not on file   Social History Narrative     Not on file       Patient Care Team:  Ned Kwong MD as PCP - General  Ned Kwong MD as Assigned PCP  Tamika Torres MD as Physician (Hematology and Oncology)  Berna Clark CNP as Nurse Practitioner (Hematology and Oncology)  Stephani Cisse RN as Oncology Nurse Navigator (Hematology and Oncology)          Objective:     Vitals:    07/15/20 1526   BP: 132/80   Pulse: (!) 103   SpO2: 95%   Weight: 196 lb 12.8 oz (89.3 kg)   Height: 6' (1.829 m)         Physical Exam:  General Appearance:  Alert, cooperative, no distress, appears stated age   Head:  Normocephalic, without obvious abnormality, atraumatic   Eyes:  PERRL, conjunctiva/corneas clear, EOM's intact   Ears:  Normal TM's and external ear canals, both ears   Nose: Nares normal, septum midline, mucosa normal, no drainage   Throat: Lips, mucosa, and tongue normal; teeth and gums normal   Neck: Supple, symmetrical, trachea midline, no adenopathy, thyroid: not enlarged, symmetric, no tenderness/mass/nodules   Back:   Symmetric, no curvature, ROM normal, no CVA tenderness   Lungs:   Clear to auscultation bilaterally, respirations unlabored   Chest Wall:  No  tenderness or deformity   Heart:  Regular rate and rhythm, S1, S2 normal, no murmur, rub or gallop   Abdomen:   Soft, non-tender, bowel sounds active all four quadrants,  no masses, no organomegaly   Extremities: Extremities normal, atraumatic, no cyanosis or edema   Skin: Skin color, texture, turgor normal, no rashes or lesions   Lymph nodes: Has a medium enlarged hard lymph node at the right mandible bone.    Neurologic: Normal,Coordinated, smooth gait, balance, rapid alternating movements, sensory functioning, and cranial nerves II-XII grossly intact. DTR's+2 bilaterally brachioradialis, knee, ankle.            There are no Patient Instructions on file for this visit.    EKG:  n/a    Labs:  No labs were ordered during this visit    Immunization History   Administered Date(s) Administered     INFLUENZA,RECOMBINANT,INJ,PF QUADRIVALENT 18+YRS 11/28/2018, 10/25/2019     Influenza, inj, historic,unspecified 10/20/2016     Influenza,seasonal,quad inj =/> 6months 10/20/2016, 11/22/2017     Td, adult adsorbed, PF 02/13/2019     ZOSTER, LIVE 10/20/2016     ZOSTER, RECOMBINANT, IM 10/25/2019, 03/12/2020           Electronically signed by Coleen Browne CNP 07/15/20 3:25 PM

## 2021-06-10 NOTE — PROCEDURES
Procedures  Clinical Treatment Planning Note    The complex radiotherapy planning will be done for the patient for his right parotid gland lymphoma.  The patient had a planning CT earlier for planning purpose.  The treatment aids were used for planning, including head rest, face mask and bite block to help keep the same position during the daily radiation therapy.  The therapy planning is necessary to reduce radiotherapy dose to the normal critical organs which is not possible with simple treatment.  In addition, dose to the target and the critical structures requires three dimensional analysis of the isodose distributions.  This planning will be done to reduce dose to the spinal cord, eyes, cranial nerves, ears, brain, esophagus, lung, thyroid gland, parotid glands, bone, and soft tissue.     I will contour the clinical tumor volume  CTV , with expanded volume of planning treatment volume  PTV  on the treatment planning system.  The critical structures will be outlined, including  spinal cord, brain, eyes, esophagus, parotid glands, lungs, mandible, and soft tissue.     Treatment  planning will be done on the computer treatment planning system.  The multiple fields will be used to achieve optimal coverage of the target volume.  Dose distribution to the above critical structures will be reviewed.  Isodose distribution along with the X, Y, Z plan will also be reviewed.  Custom blocking will be used to shield normal structures.  The beam s eye views will be reviewed and the digital reconstructed image will be reviewed on the planning software.  The IMRT/IGRT technique will be used to deliver optimal dose to the tumor and to protect normal tissue.  The patient will receive 200 cGy each fraction to a total of 2400 cGy in 12 treatments using 6-MV photons.         Doris Priest MD, PhD  Department of Radiation Oncology   [facility]  Tel: [phone]  Page: [phone]    [place] [id number]  [place] [address]     [facility]  [address]   [place] [address]

## 2021-06-10 NOTE — PROGRESS NOTES
Patient arrived ambulatory at 10:30. Educated patient on having a BMB done, premeds available, and post procedure care. Patient has declined the premeds. Consent was signed. A left sided bone marrow biopsy was performed by Dr. Gregory Yeager - completed at 11:43. Patient rested supine, with pressure to site. VSS. No drainage or bleeding noted from the biopsy site (compression dressing is clean, dry, and intact). Drank 240 mls of juice. Post treatment AVS was given and explained to the patient.     At 12:30 this writer took the patient via w/c to awaiting vehicle, in stable condition. Patient plans to return on August 13th for a PET scan; and follows up with Dr. Torres on August 17th. Instructed to call with any questions or concerns prior to the next scheduled appointment.    Nadia Dhaliwal RN

## 2021-06-10 NOTE — PROGRESS NOTES
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  Based on the surgical note there is likely residual tumor in the deep portion of the parotid gland.    Reviewed pathologic diagnosis with patient and his wife was on the phone.  Splane that this is a low-grade lymphoma and reviewed history.    Explained that currently full appears to be in stage I.  Recommend bone marrow aspirate and biopsy for additional staging.    We will send pathology again to [place] for review and second opinion.    Discussed treatment options based on the bone marrow results.  We will repeat PET scan to ensure there has been no change in the stage of disease.    If the patient does not have bone marrow involvement and has stage I disease will refer for radiation therapy provide prognosis.    The patient does have either bone marrow involvement or other systemic disease be either observation with treatment when he is symptomatic versus treatment with single agent Rituxan.    Reviewed rationale for observation if he has advanced disease.  Splane that he would have the same chance for response irrespective time when treatment was started.    Reviewed small risk of transformation into more aggressive lymphoma and associated symptoms of fevers, chills, night sweats weight loss.    Reviewed bone marrow procedures and small risks pain, bleeding and infection.    Patient's numerous questions answered.  45 minutes spent majority in counseling and coordination of care.    Plan:  We will schedule bone marrow  aspirate and biopsy  Send pathology for review to the [facility]  PET scan  Follow-up after bone marrow and PET    Measurable disease: PET scan      ECOG Performance   ECOG Performance Status: 0    Distress Assessment  Distress Assessment Score: No distress    Pain       Problem List    1. 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)  NM PET CT Skull to Mid Thigh    Bone Marrow Biopsy    Bone Marrow Aspiration    lidocaine (PF) 10 mg/mL (1 %) injection 10 mL (XYLOCAINE-MPF)    Bone Marrow Biopsy and Exam    Flow cytometry    HM1(CBC and Differential)    Chromosome Analysis, Bone Marrow    LORazepam tablet 0.5 mg (ATIVAN)    HYDROcodone-acetaminophen 5-325 mg per tablet 1 tablet    naloxone injection 0.2-0.4 mg (NARCAN)    naloxone injection 0.2-0.4 mg (NARCAN)   2. 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 Dejan Zarco is here for follow-up.  Last seen in early June.  [place] review of his pathology firmed Jas type was unclear.  He was recommended excisional biopsy about 2 weeks ago.  The superficial parotid tumor was excised but the deep was not.  Patient has recovered fine from the surgery.  Denies any fever chills or sweats.  ECOG status is 0.          June 2020:      Right neck mass has not changed.  Reports fatigue but he is very active and biked 30 miles yesterday and also worked in his yard.  No other new symptoms or problems.    Pain Status  Currently in Pain: No/denies    Review of Systems    Constitutional  Constitutional (WDL): All constitutional elements are within defined limits  Neurosensory  Neurosensory (WDL): Exceptions to WDL  Cardiovascular  Cardiovascular (WDL): All cardiovascular elements are within defined  limits  Pulmonary  Respiratory (WDL): Within Defined Limits  Gastrointestinal  Gastrointestinal (WDL): Exceptions to WDL  Diarrhea: Increase of <4 stools per day over baseline, mild increase in ostomy output compared to baseline  Genitourinary  Genitourinary (WDL): All genitourinary elements are within defined limits  Integumentary  Integumentary (WDL): All integumentary elements are within defined limits  Patient Coping  Patient Coping: Accepting  Distress Assessment  Distress Assessment Score: No distress  Accompanied by  Accompanied by: Alone    Past History  History reviewed. No pertinent past medical history.      Past Surgical History:   Procedure Laterality Date     CYST REMOVAL      Scalp     PAROTIDECTOMY Right 7/28/2020    Procedure: excisional biopsy of right parotid lymphoma, 23 HOUR;  Surgeon: Kota Bradley MD;  Location: [facility];  Service: ENT     scalp surgery      lesion removal behind ear and cyst top of head      US BIOPSY FINE NEEDLE ASPIRATION LYMPH NODE (BREAST) LEFT Left 5/12/2020     US BIOPSY FINE NEEDLE ASPIRATION LYMPH NODE (BREAST) LEFT Left 5/12/2020       Physical Exam    Recent Vitals 8/7/2020   Height -   Weight -   BSA (m2) -   /72   Pulse 70   Temp 98.2   Temp src 1   SpO2 98   Some recent data might be hidden       GENERAL: Alert and oriented to time place and person. Seated comfortably. In no distress.    HEAD: Atraumatic and normocephalic.    EYES: LOUIE, EOMI.  No pallor.  No icterus.    Oral cavity: no mucosal lesion or tonsillar enlargement.    NECK: supple. JVP normal.  No thyroid enlargement.    LYMPH NODES: No palpable, cervical, axillary or inguinal lymphadenopathy.    CHEST: clear to auscultation bilaterally.  Resonant to percussion throughout bilaterally.  Symmetrical breath movements bilaterally.    CVS: S1 and S2 are heard. Regular rate and rhythm.  No murmur or gallop or rub heard.  No peripheral edema.    ABDOMEN: Soft. Not tender. Not distended.  No  palpable hepatomegaly or splenomegaly.  No other mass palpable.  Bowel sounds heard.    EXTREMITIES: Warm.    SKIN: no rash, or bruising or purpura.  Has a full head of hair.      Lab Results    No results found for this or any previous visit (from the past 168 hour(s)).    Imaging    No results found.      Signed by: Tamika Torres MD

## 2021-06-10 NOTE — PROGRESS NOTES
Pt here for consult with Dr. Priest for rt parotid gland lymphoma. Recent PET shows no activity in gland, but RT considered for higher chance of cure. Pt feeling well, no symptoms. I gave him a new patient folder and we discussed the routine for RT including consult, simulation, mask, daily treatment, weekly RO visit, and common s/e of skin irritation, fatigue, dry mouth, and altered taste. I also gave him the H&N exercise sheet. He verbalized his understanding of all educ.

## 2021-06-10 NOTE — TELEPHONE ENCOUNTER
Patient calls in today wondering if his excisional biopsy results will be back in time for his appointment tomorrow morning with Dr Torres.  Call placed to [facility] at [phone].  They tell me that the tissue was sent for additional tests and is not completed at this time.  Per Dr Torres we will push the patient's appointment out until 8/7 at 1:00 PM.  We will call prior to 8/7 to [facility] to see if it is resulted.  Patient verbalized understanding and was appreciative.    Caroline Fink RN

## 2021-06-10 NOTE — PROGRESS NOTES
Bone Marrow Biopsy and Aspiration Procedure Note     Informed consent was obtained and potential risks including bleeding, infection and pain were reviewed with the patient.     Left posterior iliac crest prepped with Betadine.     Lidocaine 1% local anesthesia infiltrated into the subcutaneous tissue, 10 cc total.    Left bone marrow biopsy and aspirate were obtained.     The procedure was tolerated well, and there were no complications.    Specimens sent for: histology, flow cytometry, and cytogenetics.    Estimated blood loss:  4 cc.    Physician: Gregory Yeager MD

## 2021-06-10 NOTE — ANESTHESIA PREPROCEDURE EVALUATION
Anesthesia Evaluation      Patient summary reviewed   No history of anesthetic complications     Airway   Mallampati: III  Neck ROM: full   Pulmonary - negative ROS and normal exam                          Cardiovascular - negative ROS and normal exam   Neuro/Psych - negative ROS     Endo/Other - negative ROS      GI/Hepatic/Renal - negative ROS      Other findings: lymphoma      Dental - normal exam   (+) caps                       Anesthesia Plan  Planned anesthetic: general endotracheal  Versed/fentanyl/propofol/kyle  Ketamine PRN  Decadron/zofran    ASA 2   Induction: intravenous   Anesthetic plan and risks discussed with: patient and spouse  Anesthesia plan special considerations: antiemetics,   Post-op plan: routine recovery      Results for orders placed or performed in visit on 07/24/20   COVID-19 Virus PCR MRF    Specimen: Nasopharyngeal Swab; Respiratory   Result Value Ref Range    COVID-19 VIRUS SPECIMEN SOURCE Nasopharyngeal     2019-nCOV Not Detected          Chemistry        Component Value Date/Time     05/12/2020 1241    K 4.7 05/12/2020 1241     05/12/2020 1241    CO2 29 05/12/2020 1241    BUN 22 05/12/2020 1241    CREATININE 0.86 05/12/2020 1241    CREATININE 0.85 05/12/2020 1241    GLU 94 05/12/2020 1241        Component Value Date/Time    CALCIUM 9.5 05/12/2020 1241    CALCIUM 9.5 05/12/2020 1241    ALKPHOS 62 05/12/2020 1241    AST 17 05/12/2020 1241    ALT 15 05/12/2020 1241    BILITOT 0.6 05/12/2020 1241        Lab Results   Component Value Date    WBC 5.6 05/12/2020    HGB 14.4 05/12/2020    HCT 45.2 05/12/2020    MCV 90 05/12/2020     05/12/2020

## 2021-06-10 NOTE — PROGRESS NOTES
Patient will need a baseline sleep study ordered if last sleep study is over 5 years old. Please order this study for patient . Thank you.   Reviewed most recent pathology with the male pathologist.  She expressed that this is a tough case and could be low-grade lymphoma versus reactive tissue.  Additional tests are pending and their final result may only be available by the end of this week.  I did speak to Dr. Priest and we will hold on starting radiation until final pathology result is available.

## 2021-06-10 NOTE — ANESTHESIA CARE TRANSFER NOTE
Last vitals:   Vitals:    07/28/20 1048   BP: 139/63   Pulse: 67   Resp: 16   Temp: 36.2  C (97.2  F)   SpO2: 99%     Patient's level of consciousness is drowsy  Spontaneous respirations: yes  Maintains airway independently: yes  Dentition unchanged: yes  Oropharynx: oropharynx clear of all foreign objects    QCDR Measures:  ASA# 20 - Surgical Safety Checklist: WHO surgical safety checklist completed prior to induction    PQRS# 430 - Adult PONV Prevention: 4558F - Pt received => 2 anti-emetic agents (different classes) preop & intraop  ASA# 8 - Peds PONV Prevention: NA - Not pediatric patient, not GA or 2 or more risk factors NOT present  PQRS# 424 - Christina-op Temp Management: 4559F - At least one body temp DOCUMENTED => 35.5C or 95.9F within required timeframe  PQRS# 426 - PACU Transfer Protocol: - Transfer of care checklist used  ASA# 14 - Acute Post-op Pain: ASA14B - Patient did NOT experience pain >= 7 out of 10

## 2021-06-10 NOTE — PROGRESS NOTES
[facility] case is to get a second opinion through [facility]. Requisition form filled out, signed and faxed to [phone].    Case #: [id number]    Echo Sanchez RN, Oncology Clinic   [facility]

## 2021-06-10 NOTE — PROGRESS NOTES
Harry is here today for ongoing management of DLBCL. Today is a f/u for imaging and bx results with Dr. Torres. Aicha Dash

## 2021-06-10 NOTE — PROCEDURES
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     INDICATION: Definitive radiation therapy is recommended for patient for his marginal zone lymphoma.     CONSENT: The possible risks and side effects of radiation therapy have been discussed with the patient in detail and at great length. Questions are answered to patient's satisfaction. Written consent was obtained.     SIMULATION: The patient is in a supine position with a headrest and face mask to help keep the same position during the daily radiation therapy. Tentative isocenter is set up in the center of the head and neck region. We will acquire CT information to help us better locate target and design radiation therapy field. We are also going to fuse her diagnostic PET/CT scan with our planning CT to help us better outline target.     BLOCKS: Custom blocks will be drawn to minimize radiation to normal tissues and to protect normal organs including, but not limited to, eyes, cranial nerves, ears, brain, brainstem, spinal cord, parotid gland, thyroid gland, lungs, esophagus, bone and soft tissue.     DOSAGE: I plan to give him radiation therapy at 200 cGy each fraction to a total of 2400 cGy to the right parotid gland/neck region.  I will consider to use 3D or IMRT/IGRT technique to help us to better locate target and to protect normal tissues.      Doris Priest MD, PhD  Department of Radiation Oncology   [facility]  Tel: [phone]  Page: [phone]    [facility]  [address] [place] [address]     [place] [id number] [facility] [place] [address]

## 2021-06-10 NOTE — ANESTHESIA POSTPROCEDURE EVALUATION
Patient: Dejan Zarco  Procedure(s):  excisional biopsy of right parotid lymphoma, 23 HOUR (Right)  Anesthesia type: general    Patient location: Phase II Recovery  Last vitals:   Vitals Value Taken Time   /63 7/28/2020 11:11 AM   Temp 36.2  C (97.2  F) 7/28/2020 10:48 AM   Pulse 66 7/28/2020 11:11 AM   Resp 16 7/28/2020 11:11 AM   SpO2 95 % 7/28/2020 11:11 AM     Post vital signs: stable  Level of consciousness: awake and responds to simple questions  Post-anesthesia pain: pain controlled  Post-anesthesia nausea and vomiting: no  Pulmonary: unassisted, return to baseline  Cardiovascular: stable and blood pressure at baseline  Hydration: adequate  Anesthetic events: no    QCDR Measures:  ASA# 11 - Christina-op Cardiac Arrest: ASA11B - Patient did NOT experience unanticipated cardiac arrest  ASA# 12 - Christina-op Mortality Rate: ASA12B - Patient did NOT die  ASA# 13 - PACU Re-Intubation Rate: ASA13B - Patient did NOT require a new airway mgmt  ASA# 10 - Composite Anes Safety: ASA10A - No serious adverse event    Additional Notes:

## 2021-06-11 NOTE — PROGRESS NOTES
Bone Marrow Biopsy and Aspiration Procedure Note     Informed consent was obtained and potential risks including bleeding, infection and pain were reviewed with the patient.     Left posterior iliac crest prepped with Betadine.     Lidocaine 1% local anesthesia infiltrated into the subcutaneous tissue, `0 cc in total.    Left bone marrow biopsy and aspirate were obtained.     The procedure was tolerated well, and there were no complications.    Estimated blood loss:  1 cc.    Specimens sent for: histology, flow cytometry, and cytogenetics.    Physician: Gregory Yeager MD

## 2021-06-11 NOTE — TELEPHONE ENCOUNTER
Harry called me this morning frustrated.  He is unhappy with the lack of communication with his care.  He has had to have 2 biopsies, both requiring 2nd opinions on the pathology but no one is keeping him up on when the results will be ready, going to MD appointment and finding out the results are not in and he has to reschedule.  Once he called to verify the results were ready and they weren't.  He feels if he hadn't called then he would have gone again to an unnecessary appointment.    He started radiation on 9-3.  After being placed on the table, his head strapped in place and then what he thought was radiation.  When he exited the room, Dr. Priest came to tell him he won't be needing radiation yet as his pathology was sent for a 2nd opinion and he may not even need the radiation after all.  He did not know his pathology was sent for a 2nd opinion as he had been told he had non-hodgkin's lymphoma.  I have sent a message to his Oncologist and his nurse to see if one of them can call Harry with an update on his case.

## 2021-06-11 NOTE — TELEPHONE ENCOUNTER
Patient calls in today wondering if we have heard anything back from the pathology that was sent to [facility].  The pathology that was sent was a case from [facility].  After a couple of phone calls back and forth between them and myself, it sounds like there is a report that was sent back today and was placed on Dr. Yeager desk.  He does need to review this and it sounds like he is out of the office.  The staff at [facility] did send an email to him notifying him that this should be done as soon as he is able.    Call placed to patient to let him know that it sounds like this will be early next week when we will have an answer back for him.  He verbalized understanding and was appreciative.    Caroline Fink RN

## 2021-06-11 NOTE — TELEPHONE ENCOUNTER
Reviewed pathology reports in detail with patient.  Second biopsy, excisional, did not show Lymphoma per [place].  Dr Yeager does feel that block 2 is consistent with a MALT Lymphoma.  Final results available tomorrow.  He will again send tissue to [place] for a 3rd opinion.  If this confirms Lymphoma we will proceed with radiation.  A third lab opinion may be required as well.  Could ryder 1-3 weeks before we can proceed with treatment.  Patient is feeling ok and comfortable with our plans.

## 2021-06-12 NOTE — PROGRESS NOTES
Latest addendum has been mailed to patient per his request. He is aware that he will return to clinic on 12/03.    Echo Sanchez RN, Oncology Clinic   [facility]

## 2021-06-12 NOTE — TELEPHONE ENCOUNTER
Harry called.  He was expecting to receive a copy of his addendum to his biopsy and it has not arrived.  I made some calls and found what he is asking for.  I confirmed his address and am putting it in today's mail.  He will let me know if he has any further issues.

## 2021-06-12 NOTE — PROGRESS NOTES
Reviewed latest pathology information with present.  Per Dr Yeager, additional molecular tests are negative and therefore there is  No clear diagnosis of Lymphoma.  Official documentation is not ready yet.    Dr Yeager will call [place] regarding possible correction of their report on the original biopsy.    Relayed all information to patient.  Based on latest data, no plans for radiation.  Patient will need close fu and is scheduled for December.

## 2021-06-13 NOTE — PROGRESS NOTES
Assessment:      Healthy male exam.    HCM  Lymphoma with unknwon EBV status  Melanoma  Plan:       All questions answered.   HCM-obtain fasting blood work today and follow-up based on results, up-to-date on immunizations, up-to-date on colon cancer screening  Lymphoma with unknwon EBV status-reviewed patient's biopsy results from his previous year currently monitoring with oncology tumor with no clear markings of lymphoma after multiple pathology review  Melanoma-recent diagnosis with dermatology removal from back and left arm-on frequent monitoring with dermatology  Subjective:      Dejan Zarco is a 65 y.o. male who presents for an annual exam. The patient reports that there is not domestic violence in his life.  Patient had a busy 2020-Right parotid mass thought initially to be a lymphoma after further evaluation of multiple pathology reviews was found to be more of a tumor burden without clear lymphoma characteristics-he is following with oncology.  Recently diagnosed with melanoma the back and left upper extremity removed by dermatology and is on frequent follow-up with them as well.  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  Patient is trying to spend time with cross-country skiing and is hoping for more snow this winter.  Imaging is found a small nodule in the left Part of the thyroid that would need reevaluation with ultrasound, May 2021.    Healthy Habits:   Regular Exercise: Yes  Sunscreen Use: Yes  Healthy Diet: Yes  Dental Visits Regularly: Yes  Seat Belt: Yes  Colonoscopy: Yes  Lipid Profile: Yes  Glucose Screen: Yes        Immunization History   Administered Date(s) Administered     INFLUENZA,RECOMBINANT,INJ,PF QUADRIVALENT 18+YRS 11/28/2018, 10/25/2019, 10/24/2020     Influenza, inj, historic,unspecified 10/20/2016     Influenza,seasonal,quad inj =/> 6months 10/20/2016, 11/22/2017     Td, adult adsorbed, PF  02/13/2019     ZOSTER, LIVE 10/20/2016     ZOSTER, RECOMBINANT, IM 10/25/2019, 03/12/2020     Immunization status: up to date and documented.    No exam data present    Current Outpatient Medications   Medication Sig Dispense Refill     ascorbic acid, vitamin C, (VITAMIN C) 1000 MG tablet Take 1,000 mg by mouth as needed.       aspirin 81 MG EC tablet Take 81 mg by mouth daily.       cetirizine (ZYRTEC) 10 MG tablet Take 10 mg by mouth daily.       cholecalciferol, vitamin D3, (VITAMIN D3) 50 mcg (2,000 unit) Tab Take by mouth as needed.       ibuprofen (ADVIL) 200 MG tablet Take 200 mg by mouth every 6 (six) hours as needed for pain.       zinc gluconate 30 mg Tab Take by mouth as needed.       No current facility-administered medications for this visit.      Past Medical History:   Diagnosis Date     Lymphoma (H)      Past Surgical History:   Procedure Laterality Date     CYST REMOVAL      Scalp     PAROTIDECTOMY Right 7/28/2020    Procedure: excisional biopsy of right parotid lymphoma, 23 HOUR;  Surgeon: Kota Bradley MD;  Location: [facility];  Service: ENT     scalp surgery      lesion removal behind ear and cyst top of head      US BIOPSY FINE NEEDLE ASPIRATION LYMPH NODE (BREAST) LEFT Left 5/12/2020     US BIOPSY FINE NEEDLE ASPIRATION LYMPH NODE (BREAST) LEFT Left 5/12/2020     Patient has no known allergies.  Family History   Problem Relation Age of Onset     Ovarian cancer Mother 76     Heart disease Father      Stroke Father      No Medical Problems Sister      Heart disease Brother      Diabetes Brother      No Medical Problems Brother      Social History     Socioeconomic History     Marital status:      Spouse name: Not on file     Number of children: Not on file     Years of education: Not on file     Highest education level: Not on file   Occupational History     Not on file   Social Needs     Financial resource strain: Not on file     Food insecurity     Worry: Not on file      "Inability: Not on file     Transportation needs     Medical: Not on file     Non-medical: Not on file   Tobacco Use     Smoking status: Never Smoker     Smokeless tobacco: Never Used   Substance and Sexual Activity     Alcohol use: Yes     Comment: 1-2/week     Drug use: No     Sexual activity: Not on file   Lifestyle     Physical activity     Days per week: Not on file     Minutes per session: Not on file     Stress: Not on file   Relationships     Social connections     Talks on phone: Not on file     Gets together: Not on file     Attends [employer] service: Not on file     Active member of club or organization: Not on file     Attends meetings of clubs or organizations: Not on file     Relationship status: Not on file     Intimate partner violence     Fear of current or ex partner: Not on file     Emotionally abused: Not on file     Physically abused: Not on file     Forced sexual activity: Not on file   Other Topics Concern     Not on file   Social History Narrative     Not on file       Review of Systems  General:  Denies problem  Eyes: Denies problem  Ears/Nose/Throat: Denies problem  Cardiovascular: Denies problem  Respiratory:  Denies problem  Gastrointestinal:  Denies problem  Genitourinary: Denies problem  Musculoskeletal:  Denies problem  Skin: Denies problem  Neurologic: Denies problem  Psychiatric: Denies problem  Endocrine: Denies problem  Heme/Lymphatic: Denies problem   Allergic/Immunologic: Denies problem        Objective:     Vitals:    12/18/20 1003   BP: 135/73   Pulse: 65   Resp: 18   Temp: 97.6  F (36.4  C)   TempSrc: Oral   Weight: 198 lb 3.2 oz (89.9 kg)   Height: 5' 10.75\" (1.797 m)     Body mass index is 27.84 kg/m .    Physical  General Appearance: Alert, cooperative, no distress, appears stated age  Head: Normocephalic, without obvious abnormality, atraumatic  Eyes: PERRL, conjunctiva/corneas clear, EOM's intact  Ears: Normal TM's and external ear canals, both ears  Back: Symmetric, no " curvature, ROM normal, no CVA tenderness  Lungs: Clear to auscultation bilaterally, respirations unlabored  Heart: Regular rate and rhythm, S1 and S2 normal, no murmur, rub, or gallop,  Abdomen: Soft, non-tender, bowel sounds active all four quadrants 1 my patients I am having a conversation with and eliminate will be given on Monday and Wednesday  Vaccine here this would next week but  Musculoskeletal: Normal range of motion. No joint swelling or deformity.   Extremities: Extremities normal, atraumatic, no cyanosis or edema  Skin: Skin color, texture, turgor normal, no rashes or lesions  Neurologic: He is alert. He has normal reflexes.   Psychiatric: He has a normal mood and affect.

## 2021-06-13 NOTE — PROGRESS NOTES
[facility] Hematology and Oncology Progress Note    Patient: Dejan Zarco  MRN: [id number]  Date of Service: 12/03/2020        Reason for Visit    Chief Complaint   Patient presents with     HE Cancer     Lymphoma       Assessment and Plan    Abnormal B-cell proliferation with an intraparotid lymph node not diagnostic of lymphoma  Initial FNA in May 2020 read as diffuse large cell lymphoma here, but lymphoma type unknown at the [facility]  Thyroid nodule, plan for follow-up ultrasound through his primary in a year  Fatigue    Patient had exhaustive review of pathology from both biopsies including molecular studies and the final conclusion is that there is no clear diagnosis of lymphoma.    He has no palpable mass currently in the right parotid area and no other palpable adenopathy or B symptoms.    Recommend continued observation.  We will plan a follow-up in the office for exam again in 3 months.  He knows to call if he develops new swelling.    Plan: As above    Measurable disease: PET scan      ECOG Performance   ECOG Performance Status: 1    Distress Assessment  Distress Assessment Score: No distress    Pain       Problem List    1. 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  I spoke to him 2 months ago with final report of the pathology which concluded that there was no clear evidence of lymphoma.  He is doing fine.  No palpable swelling in the area of the parotid.  No other palpable adenopathy.  No B symptoms.  No infectious problems or bleeding.  Good energy level.  He remains very active.  ECOG status is 0.    August 2020    Mr. Dejan Zarco returns for reevaluation.  Seen 2 weeks ago.  He is undergone bone marrow aspirate and biopsy and PET scan and is here to review results.  His most recent  pathology was sent to the [facility] for evaluation but the results are pending.    Patient overall is feeling fine.  Continues to complain of Kvng.  He is able to bike 30 miles but feels that his abilities have diminished for the last for 5 months.  He is otherwise feeling fine.      August 7, 2020:  Mr. Dejan Zarco is here for follow-up.  Last seen in early June.  [place] review of his pathology confirmed lymphoma but type was unclear.  He was recommended excisional biopsy about 2 weeks ago.  The superficial parotid tumor was excised but the deep was not.  Patient has recovered fine from the surgery.  Denies any fever chills or sweats.  ECOG status is 0.          June 2020:      Right neck mass has not changed.  Reports fatigue but he is very active and biked 30 miles yesterday and also worked in his yard.  No other new symptoms or problems.    Pain Status  Currently in Pain: No/denies    Review of Systems    Constitutional  Constitutional (WDL): Exceptions to WDL  Fatigue: Fatigue relieved by rest  Neurosensory  Neurosensory (WDL): All neurosensory elements are within defined limits  Cardiovascular  Cardiovascular (WDL): All cardiovascular elements are within defined limits  Pulmonary  Respiratory (WDL): Within Defined Limits  Gastrointestinal  Gastrointestinal (WDL): Exceptions to WDL  Genitourinary  Genitourinary (WDL): All genitourinary elements are within defined limits  Integumentary  Integumentary (WDL): All integumentary elements are within defined limits  Patient Coping  Patient Coping: Accepting  Distress Assessment  Distress Assessment Score: No distress  Accompanied by  Accompanied by: Alone    Past History  Past Medical History:   Diagnosis Date     Lymphoma (H)          Past Surgical History:   Procedure Laterality Date     CYST REMOVAL      Scalp     PAROTIDECTOMY Right 7/28/2020    Procedure: excisional biopsy of right parotid lymphoma, 23 HOUR;  Surgeon: Kota Bradley MD;  Location:  [facility];  Service: ENT     scalp surgery      lesion removal behind ear and cyst top of head      US BIOPSY FINE NEEDLE ASPIRATION LYMPH NODE (BREAST) LEFT Left 5/12/2020     US BIOPSY FINE NEEDLE ASPIRATION LYMPH NODE (BREAST) LEFT Left 5/12/2020       Physical Exam    Recent Vitals 12/3/2020   Height -   Weight 200 lbs 13 oz   BSA (m2) 2.15 m2   /73   Pulse 76   Temp 97.7   Temp src 1   SpO2 99   Some recent data might be hidden       GENERAL: Alert and oriented to time place and person. Seated comfortably. In no distress.    HEAD: Atraumatic and normocephalic.    EYES: LOUIE, EOMI.  No pallor.  No icterus.    Oral cavity: no mucosal lesion or tonsillar enlargement.    NECK: supple. JVP normal.  No thyroid enlargement.  No mass or lymph node palpable in the parotid area    LYMPH NODES: No palpable, cervical, axillary or inguinal lymphadenopathy.    CHEST: clear to auscultation bilaterally.  Resonant to percussion throughout bilaterally.  Symmetrical breath movements bilaterally.    CVS: S1 and S2 are heard. Regular rate and rhythm.  No murmur or gallop or rub heard.  No peripheral edema.    ABDOMEN: Soft. Not tender. Not distended.  No palpable hepatomegaly or splenomegaly.  No other mass palpable.  Bowel sounds heard.    EXTREMITIES: Warm.    SKIN: no rash, or bruising or purpura.  Has a full head of hair.      Lab Results    No results found for this or any previous visit (from the past 168 hour(s)).    Imaging    No results found.      Signed by: Tamika Torres MD

## 2021-06-14 NOTE — PROGRESS NOTES
Assessment:      Healthy male exam.    HCM  Plan:       All questions answered.   HCM-patient with flu vaccine today is otherwise up-to-date on vaccines, will obtain fasting labs today and follow-up based on results  Subjective:      Dejan Zarco is a 62 y.o. male who presents for an annual exam. The patient reports that there is not domestic violence in his life.  He has no acute concerns today  He is exercising on a regular basis  Plan on retiring the spring 2018  He has no acute concerns today  Blood pressure and vitals at normal range  Reviewed previous laboratory values with him  Is increased risk of cardiovascular disease with family history  His daughter is been having some problems with chronic Lyme disease    Healthy Habits:   Regular Exercise: Yes  Sunscreen Use: Yes  Healthy Diet: Yes  Dental Visits Regularly: Yes  Seat Belt: Yes  Sexually active: Yes  Colonoscopy: Yes  Lipid Profile: Yes  Glucose Screen: Yes        Immunization History   Administered Date(s) Administered     Influenza, seasonal,quad inj 36+ mos 10/20/2016, 11/22/2017     ZOSTER 10/20/2016     Immunization status: up to date and documented.    No exam data present    Current Outpatient Prescriptions   Medication Sig Dispense Refill     aspirin 81 MG EC tablet Take 81 mg by mouth daily.       UNABLE TO FIND Med Name:Allergy medications over the counter       No current facility-administered medications for this visit.      No past medical history on file.  Past Surgical History:   Procedure Laterality Date     CYST REMOVAL      Scalp     Review of patient's allergies indicates no known allergies.  Family History   Problem Relation Age of Onset     Ovarian cancer Mother      Heart disease Father      Stroke Father      No Medical Problems Sister      Heart disease Brother      Diabetes Brother      No Medical Problems Brother      Social History     Social History     Marital status:      Spouse name: N/A     Number of  "children: N/A     Years of education: N/A     Occupational History     Not on file.     Social History Main Topics     Smoking status: Never Smoker     Smokeless tobacco: Never Used     Alcohol use Yes     Drug use: No     Sexual activity: Not on file     Other Topics Concern     Not on file     Social History Narrative       Review of Systems  General:  Denies problem  Eyes: Denies problem  Ears/Nose/Throat: Denies problem  Cardiovascular: Denies problem  Respiratory:  Denies problem  Gastrointestinal:  Denies problem  Genitourinary: Denies problem  Musculoskeletal:  Denies problem  Skin: Denies problem  Neurologic: Denies problem  Psychiatric: Denies problem  Endocrine: Denies problem  Heme/Lymphatic: Denies problem   Allergic/Immunologic: Denies problem        Objective:     Vitals:    11/22/17 0951   BP: 122/70   Pulse: 74   Temp: 97.6  F (36.4  C)   TempSrc: Oral   Weight: 199 lb (90.3 kg)   Height: 5' 11\" (1.803 m)     Body mass index is 27.75 kg/(m^2).    Physical  General Appearance: Alert, cooperative, no distress, appears stated age  Head: Normocephalic, without obvious abnormality, atraumatic  Eyes: PERRL, conjunctiva/corneas clear, EOM's intact  Ears: Normal TM's and external ear canals, both ears  Nose: Nares normal, septum midline,mucosa normal, no drainage  Throat: Lips, mucosa, and tongue normal; teeth and gums normal  Neck: Supple, symmetrical, trachea midline, no adenopathy;  thyroid: not enlarged, symmetric, no tenderness/mass/nodules; no carotid bruit or JVD  Back: Symmetric, no curvature, ROM normal, no CVA tenderness  Lungs: Clear to auscultation bilaterally, respirations unlabored  Heart: Regular rate and rhythm, S1 and S2 normal, no murmur, rub, or gallop,  Abdomen: Soft, non-tender, bowel sounds active all four quadrants,  no masses, no organomegaly  Genitourinary: deferred, no concerns  Musculoskeletal: Normal range of motion. No joint swelling or deformity.   Extremities: Extremities " normal, atraumatic, no cyanosis or edema  Skin: Skin color, texture, turgor normal, no rashes or lesions  Lymph nodes: Cervical, supraclavicular, and axillary nodes normal  Neurologic: He is alert. He has normal reflexes.   Psychiatric: He has a normal mood and affect.

## 2021-06-15 NOTE — PROGRESS NOTES
[facility] Hematology and Oncology Progress Note    Patient: Dejan Zarco  MRN: [id number]  Date of Service: 03/02/2021        Reason for Visit    Chief Complaint   Patient presents with     HE Cancer     Lymphoma of parotid gland        Assessment and Plan    Abnormal B-cell proliferation with an intraparotid lymph node not diagnostic of lymphoma  Initial FNA in May 2020 read as diffuse large cell lymphoma here, but lymphoma type unknown at the [facility]  Thyroid nodule, plan for follow-up ultrasound through his primary in a year  Fatigue    No recurrent enlargement of parotid gland is noted.  No B symptoms.  No other suggestion of lymphoma.    Will continue observation and do follow-up visit in clinic in 3  Months.      PLAN:    Follow-up in 3 months      Measurable disease: PET scan       ECOG Performance        Distress Assessment       Pain           Problem List    1. Parotid mass          CC: Ned Kwong MD    ______________________________________________________________________________    History of Present Illness    Mr. Dejan Zarco is spoken to for a telephone visit.  He has been doing fine over the last 3 months.  No recurrent enlargement of the parotid gland.  No other enlarged lymph nodes.  No fever chills or sweats.  Appetite is good and weight is stable.  No shortness of breath or cough.  No abdominal pain symptoms.  No infectious issues or bleeding.  ECOG status is 0.    He has had PSA rechecked a couple of times by his primary physician.  May need urology evaluation.        Pain Status  Currently in Pain: No/denies    Review of Systems    Constitutional  Constitutional (WDL): All constitutional elements are within defined limits  Neurosensory  Neurosensory (WDL): All neurosensory elements are within defined limits  Cardiovascular  Cardiovascular (WDL): All cardiovascular elements are within defined limits  Pulmonary  Respiratory (WDL): Within Defined  "Limits  Gastrointestinal  Gastrointestinal (WDL): All gastrointestinal elements are within defined limits  Genitourinary  Genitourinary (WDL): All genitourinary elements are within defined limits  Integumentary  Integumentary (WDL): All integumentary elements are within defined limits  Patient Coping  Patient Coping: Accepting  Distress Assessment     Accompanied by  Accompanied by: Alone    Past History  Past Medical History:   Diagnosis Date     Lymphoma (H)          Past Surgical History:   Procedure Laterality Date     CYST REMOVAL      Scalp     PAROTIDECTOMY Right 7/28/2020    Procedure: excisional biopsy of right parotid lymphoma, 23 HOUR;  Surgeon: Kota Bradley MD;  Location: [facility];  Service: ENT     scalp surgery      lesion removal behind ear and cyst top of head      US BIOPSY FINE NEEDLE ASPIRATION LYMPH NODE (BREAST) LEFT Left 5/12/2020     US BIOPSY FINE NEEDLE ASPIRATION LYMPH NODE (BREAST) LEFT Left 5/12/2020       Physical Exam    Recent Vitals 12/18/2020   Height 5' 10.75\"   Weight 198 lbs 3 oz   BSA (m2) 2.12 m2   /73   Pulse 65   Temp 97.6   Temp src 1   SpO2 -   Some recent data might be hidden     No exam is done.    Lab Results    No results found for this or any previous visit (from the past 168 hour(s)).    Imaging    No results found.      Signed by: Tamika Torres MD  "

## 2021-06-15 NOTE — PROGRESS NOTES
Dejan Zarco is a 65 y.o. male who is being evaluated via a billable video visit.      Objective       Vitals:  No vitals were obtained today due to virtual visit.    Video-Visit Details    Type of service:  Video Visit  Originating Location (pt. Location): Home    Distant Location (provider location):  [facility]     Platform used for Video Visit: Melany Sanchez RN, Oncology Clinic   [facility]

## 2021-06-16 PROBLEM — M54.50 ACUTE LEFT-SIDED LOW BACK PAIN WITHOUT SCIATICA: Status: ACTIVE | Noted: 2020-06-05

## 2021-06-16 PROBLEM — K11.8 PAROTID MASS: Status: ACTIVE | Noted: 2021-03-02

## 2021-06-17 NOTE — TELEPHONE ENCOUNTER
Telephone Encounter by Stephani Cisse RN at 6/2/2020  8:53 AM     Author: Stephani Cisse RN Service: -- Author Type: Registered Nurse    Filed: 6/2/2020  8:53 AM Encounter Date: 6/2/2020 Status: Signed    : Stephani Cisse RN (Registered Nurse)       I attempted to phone Bill to let him know I am his nurse navigator.  His mailbox is full so I sent an e-mail:  Juan C Mcdonald!    My name is Stephani and I am a nurse navigator at the [facility] [place].  Normally, I like to meet patients in person, but am currently working from home.  I tried to call your cell, but your mailbox is full so I thought an e-mail would be better!    My role is like a .  I am here if you have questions, concerns or need any resources.  Please dont hesitate to reach out to me.  Hopefully, we can meet in person in the near future.    Below is a welcome letter I give out which explains my role and also a page of important phone numbers.      Stephani Benavidez RN, BSN  Nurse Navigator

## 2021-06-18 NOTE — PATIENT INSTRUCTIONS - HE
Patient Instructions by Nadia Dhaliwal RN at 8/10/2020 10:30 AM     Author: Nadia Dhaliwal RN Service: -- Author Type: Registered Nurse    Filed: 8/10/2020 12:20 PM Encounter Date: 8/10/2020 Status: Signed    : Nadia Dhaliwal RN (Registered Nurse)         [facility] [place] - [phone], Dr. Torres's team  1st floor OP Infusion at [facility] - [phone]  Call with any questions or concerns before the next scheduled appointment.      Bone Marrow Aspiration and Biopsy  Does this test have other names?  Bone marrow exam  What is this test?  This is a two-part test that looks at the blood cells in a sample of bone marrow, the spongy tissue within certain bones. This test may help your healthcare provider diagnose or monitor a blood disease or health condition affecting your marrow.  Your bone marrow has a liquid part and a solid part. Aspiration uses a needle to remove a sample of the liquid part of bone marrow. Biopsy uses a larger needle to remove a small amount of bone with its marrow.  Part of the job of bone marrow is to make blood cells. This test can find out how well your bone marrow is working. This test is also done to find some types of cancer.  Why do I need this test?  You might have this test if your healthcare provider wants to find out the health of your bone marrow or to check on how well your marrow is making blood cells.  You may have an aspiration to check for:    The health of your bone marrow for a transplant    Acute leukemia    Multiple myeloma  In some cases, bone marrow aspiration is used to confirm chromosome disorders in newborns.  You may have an aspiration followed by a biopsy if you could have:    Bacterial, fungal, or parasitic infection    Unexplained anemia, leucopenia, or thrombocytopenia    Metastatic cancer or many other diseases  What other tests might I have along with this test?  Your healthcare provider may also order these tests:    Complete blood count,  or CBC    Reticulocyte count to find out your red blood cell survival rate  What do my test results mean?  Many things may affect your lab test results. These include the method each lab uses to do the test. Even if your test results are different from the normal value, you may not have a problem. To learn what the results mean for you, talk with your healthcare provider.  The lab will look at different aspects of your bone marrow to help find certain diseases or conditions. These aspects include:    Type and number of blood cells    Any abnormalities in the size, shape, or look of cells    Level of iron in the bone marrow    Abnormal amount of young white blood cells, called blasts    Any chromosomal abnormalities  Depending on what is seen, your results may mean you have an infection, a blood disease, leukemia, or cancer that has spread to the bone marrow from another site.  Your healthcare provider will take your results and combine this information with information from your physical exam, health history, and other types of tests to make a diagnosis.   If your results are negative, your provider may order other tests to diagnose your condition.   How is this test done?  These tests require a sample of bone marrow. A number of sites on your body can be used for marrow aspiration, but the hip bone is a common spot. You will likely lie on your side or stomach on an exam table. Your healthcare provider will numb the area of the test. You may feel a slight prick from the needle that the provider uses to give the numbing agent.  Does this test pose any risks?  It's not possible to numb the bone, so you may feel slight pain during the procedure. But you shouldn't feel any pain afterward. Risks from a bone marrow test are rare, but you could have bleeding or an infection.  What might affect my test results?  Other factors aren't likely to affect your results.  How do I get ready for this test?  Tell your healthcare  provider if you take aspirin or have any allergies. Also tell your provider if you are pregnant, take any blood-thinner medicines, or have a history of bleeding problems.  Be sure your provider knows about all other medicines, herbs, vitamins, and supplements you are taking. This includes medicines that don't need a prescription and any illicit drugs you may use.     Date Last Reviewed: 10/12/2015    7196-4136 [employer]. [address] [place] [address], [place] [address]. All rights reserved. This information is not intended as a substitute for professional medical care. Always follow your healthcare professional's instructions.

## 2021-06-18 NOTE — PATIENT INSTRUCTIONS - HE
Patient Instructions by Daysi Landeros RN at 8/20/2020  9:00 AM     Author: Daysi Landeros RN Service: -- Author Type: Registered Nurse    Filed: 8/20/2020  9:24 AM Encounter Date: 8/20/2020 Status: Signed    : Daysi Landeros RN (Registered Nurse)       Patient Education     Radiation Therapy Treatment  Radiation therapy uses high-energy X-rays to kill cancer cells. Radiation therapy can help you in your fight against cancer. It begins with a session to discuss treatment with your healthcare provider. If you and your provider decide on radiation, you will return for a treatment planning visit called a simulation. Then you will start treatment.  The simulation is a planning session that helps your healthcare provider target your cancer. He or she will design a radiation plan to protect your healthy tissues from radiation. Your radiation therapy team uses a special machine called a simulator to map out your treatment. The simulator is usually an X-ray machine (fluoroscopy), CT scanner, MRI scanner, or PET-CT scanner machine. Laser lights act as guides to help position your body accurately. During this visit:    The team figures out the best position for your body. They make notes in your chart so youll be placed the same way each time.    They may use special devices to keep your body correctly positioned and still during treatment. These may include molds, masks, rests, and blocks.    The team makes ink marks on your skin. These will help you get in the same position for each treatment. Tiny permanent tattoos may also be used. These tattoos may be removed later with laser treatments.    Markers such as metal balls or wires may be put on or in your body. Sometime these are taped to the skin to help with the imaging process. These work with the X-rays to position your body. The markers are removed when the visit is over.  After the team has the imaging and data, the information is sent into the computer  planning system. Your doctor and the team of physicists and dosimetrists design a treatment field. The field will best target your cancer and how it might spread. It will also help limit radiation to nearby normal tissues.  Your treatments  When the simulation and plan are completed, you will begin your daily treatments. Treatment is usually once daily, Monday through Friday, for 5 to 7 weeks. It takes less than 30 minutes. Sometimes you may need radiation twice a day, with about 6 hours between treatments.  You may need to change into a hospital gown. The radiation therapist puts you in the correct position on the treatment table, then leaves the room. Sometimes you may need more imaging before each treatment. The machine may take digital X-rays or a CT scan to help make sure you are lined up correctly. During treatment, lie as still as you can and breathe normally. You will hear noises coming from the machine. You can talk to the radiation therapist, who watches you from the control room on a TV monitor. After treatment, the therapist will help you off the table. You can then get dressed and go back to your normal activities.  After treatment  After your radiation treatments are done, you will have follow-up appointments. These are to make sure the cancer is under control. Tell your healthcare team about any side effects from the treatment. The team will help you manage them.  Date Last Reviewed: 6/1/2018 2000-2019 [employer]. [address] [place] [address], [place] [address]. All rights reserved. This information is not intended as a substitute for professional medical care. Always follow your healthcare professional's instructions.

## 2021-06-18 NOTE — PATIENT INSTRUCTIONS - HE
"Patient Instructions by Zahida Garcia, PT at 6/26/2020 10:30 AM     Author: Zahida Garcia PT Service: -- Author Type: Physical Therapist    Filed: 6/26/2020 11:00 AM Encounter Date: 6/26/2020 Status: Signed    : Zahida Garcia, PT (Physical Therapist)        BRIDGING    While lying on your back, tighten your lower abdominals, squeeze your buttocks and then raise your buttocks off the floor/bed as creating a \"Bridge\" with your body.    x10-15 repetitions  1-2 sets            "

## 2021-06-18 NOTE — PATIENT INSTRUCTIONS - HE
Patient Instructions by Zahida Garcia PT at 6/23/2020 10:30 AM     Author: Zahida Garcia PT Service: -- Author Type: Physical Therapist    Filed: 6/23/2020 11:00 AM Encounter Date: 6/23/2020 Status: Signed    : Zahida Garcia PT (Physical Therapist)         Sit on the edge of a table or chair.  Grab behind your knee.  Slowly extend your leg, as you extend your leg tip your head back.  Extend your knee as far as you can.  Do not hold.  Slowly lower your leg and tip your head forward.  x10-15 repetitions  1-2 sets  6-8x/day        Lie on your back   - Grab behind your knee slightly pulling your knee to your chest (you can use a towel if needed)   - Straighten the leg as far as you can. Get a nice easy stretch. Do not hold   - Bring the leg down   x10-15 repetitions  1-2 sets  6-8x/day            SEATED HAMSTRING STRETCH    While seated, rest your heel on the floor with your knee straight and gently lean forward until a stretch is felt behind you knee/thigh.    Hold 30 seconds x 2 each side

## 2021-06-18 NOTE — PATIENT INSTRUCTIONS - HE
"Patient Instructions by Zahida Garcia PT at 7/8/2020 10:45 AM     Author: Zahida Garcia PT Service: -- Author Type: Physical Therapist    Filed: 7/8/2020 11:10 AM Encounter Date: 7/8/2020 Status: Signed    : Zahida Garcia PT (Physical Therapist)        PRESS [employer]    Lying face down, slowly raise up and arch your back using your arms.    x10-15 repetitions      QUADRUPED ARM/LEG LIFT    Start on hands and knees while keeping your spine flat and neutral.  Tighten abdominal muscles.  Raise leg back and opposite arm and hold 3-5 seconds.  Return to original position and alternate.    x10-15 repetitions  1-2 sets        DEAD BUG    While lying on your back with your knees bent, slowly raise up one foot and opposite arm.    Retrun to starting position and then repeat on the opposite side.     Keep your low back flat on the floor the entire time.    x10-15 repetitions  1-2 sets        BRIDGE - MARCHING    While lying on your back, tighten your lower abdominals, squeeze your buttocks and then raise your buttocks off the floor/bed as creating a \"Bridge\" with your body.     While holding this position, lift one leg while maintaining a level pelvis. Set it back to the floor and then lift the opposite leg.    x10-15 repetitions  1-2 sets      PLANK    While lying face down, lift your body up on your elbows and toes. Try and maintain a straight spine. Do not allow your hips or pelvis on either side to drop.     You may modify by planking on your knees    Hold 10 seconds x 6-8 repetitions  As you increase hold time, then decreased repetitions  Hold 1 minute x 1-2 repetitions                    "

## 2021-06-20 NOTE — LETTER
Letter by Dolores Alcantara RN at      Author: Dolores Alcantara RN Service: -- Author Type: --    Filed:  Encounter Date: 5/19/2020 Status: (Other)       Dear Dejan,    Thank you for choosing [facility] for your care.  We are committed to providing you with the highest quality and compassionate healthcare services.  The following information pertains to your first appointment with our clinic.    Date/Time of appointment: Friday, May 22, 2020, check in at 2:30 p.m. Please arrive to our facility wearing a mask.     Name of your Physician: Tamika Torres MD    What to bring to your appointment:    Completed Patient History/Initial Nursing Assessment and Medication/Allergy List (these forms were sent to you).    Any paperwork or films from your physician that we have asked you to bring.    Your current insurance card(s).    Parking:    Please refer to the map included to direct you.  The [facility] is located at the [place] end of [facility] in [place], [place].      After turning onto [address] from [facility], take a right turn at the first stop sign.  We have designated parking on the left, identified as parking for Cancer Care patients (Lot D).     The Code to Enter Lot D is: 0501. This code changes monthly and will always coincide with the current month followed by 01. For example August will be 0801.  The month will continue to change but the 01 will remain constant.  If lot D is full please use Parking Lot A, directly across the street.    Please enter the Cancer Care Center on the north end of the [facility].  You will see a sign on the building.        For Medical Oncology or Hematology appointments, please take the elevator to the second floor to check in.   For Radiation Oncology appointments, please go straight through the double doors and check in.     Also please note appointments can last 1.5-2 hours.      We hope these instructions are helpful to you.  If you have any  questions or concerns, please call us at [phone].  It is our pleasure to assist you.    Warm Regards,  Stephani Cisse RN  Nurse Navigator  [phone]

## 2021-06-24 NOTE — PROGRESS NOTES
"ASSESSMENT/PLAN  1. Pulmonary nodule  On a work exit health screening patient was found to have a pulmonary nodule  Was recommended for follow-up due to the size being 7 mm  Patient is having no current symptoms of concern we will further evaluate with a CT chest without contrast  - CT Chest Without Contrast; Future    2. Health care maintenance  Patient is due for tetanus update  He will return in the summer for complete physical and fasting labs        SUBJECTIVE:   Chief Complaint   Patient presents with     Follow-up     possible right lower lobe nodule      Dejan Medinajamshid 63 y.o. male    Current Outpatient Medications   Medication Sig Dispense Refill     aspirin 81 MG EC tablet Take 81 mg by mouth daily.       UNABLE TO FIND Med Name:Allergy medications over the counter       No current facility-administered medications for this visit.      Allergies: Patient has no known allergies.   No LMP for male patient.    HPI:   Patient is here to follow-up in regards to an x-ray finding at a work exit physical  Chest x-ray revealed a 7 mm pulmonary nodule with no other concerns on x-ray finding  Is recommended to follow-up with further evaluation with a CT noncontrast imaging  Patient exercises on a regular basis and has no shortness  We will further evaluate and follow-up based on results    Patient would be updated with a tetanus    Patient is overdue for complete physical return in the upcoming months for fasting physical    ROS: negative except as per HPI    OBJECTIVE:   The patient appears well, alert, oriented x 3, in no distress.  /72 (Patient Site: Left Arm, Patient Position: Sitting, Cuff Size: Adult Regular)   Pulse 66   Temp 97.5  F (36.4  C) (Oral)   Ht 5' 11.26\" (1.81 m)   Wt 180 lb 3.2 oz (81.7 kg)   BMI 24.95 kg/m      Lungs: clear, good air entry, no wheezes, rhonchi or rales.   Cardiac: S1 and S2 normal, no murmurs, regular rate and rhythm.   Extremities: show no edema, normal " peripheral pulses.   Neurological: normal, no focal findings.  Skin: clear, dry, no rashes/lesions  Psych- normal mood and affect      Pt states an understanding and agrees to the above plan.

## 2021-06-24 NOTE — TELEPHONE ENCOUNTER
Left message to call back for: Results  Information to relay to patient:  Auditcb, Please relay message below to patient from Dr. Kwong     Yes

## 2021-06-24 NOTE — TELEPHONE ENCOUNTER
----- Message from Ned Kwong MD sent at 2/21/2019  5:15 PM CST -----  Please inform patient that CT shows no nodule.  This is a more detailed test.  Likely nodule noted on chest xray was shadow effect or overlapping structure- but no follow up needed.

## 2021-06-26 NOTE — PROGRESS NOTES
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  No B symptoms.  Fatigue has resolved.    No recurrence over more than a year.  Reassured patient.  No further follow-up in oncology.    He will call if he develops enlargement in the area of the parotid or B symptoms.    Plan: No further follow-up in oncology      Measurable disease: PET scan      ECOG Performance   ECOG Performance Status: 0    Distress Assessment  Distress Assessment Score: 1    Pain       Problem List    No diagnosis found.     CC: Ned Kwong MD    ______________________________________________________________________________    History of Present Illness    Bill returns for reevaluation.  Had a virtual visit 3 months ago.  Has been feeling fine.  No enlargement in the area of the parotid.  No other adenopathy.  No infectious problems.  No bleeding or rash.  Appetite and weight are stable.  No B symptoms.  ECOG status is 0.    December 2020:  Bill returns for reevaluation.  I spoke to him 2 months ago with final report of the pathology which concluded that there was no clear evidence of lymphoma.  He is doing fine.  No palpable swelling in the area of the parotid.  No other palpable adenopathy.  No B symptoms.  No infectious problems or bleeding.  Good energy level.  He remains very active.  ECOG status is 0.    August 2020    Mr. Dejan Zarco returns for reevaluation.   Seen 2 weeks ago.  He is undergone bone marrow aspirate and biopsy and PET scan and is here to review results.  His most recent pathology was sent to the [facility] for evaluation but the results are pending.    Patient overall is feeling fine.  Continues to complain of Kvng.  He is able to bike 30 miles but feels that his abilities have diminished for the last for 5 months.  He is otherwise feeling fine.      August 7, 2020:  Mr. Dejan Zarco is here for follow-up.  Last seen in early June.  Baker review of his pathology confirmed lymphoma but type was unclear.  He was recommended excisional biopsy about 2 weeks ago.  The superficial parotid tumor was excised but the deep was not.  Patient has recovered fine from the surgery.  Denies any fever chills or sweats.  ECOG status is 0.          June 2020:      Right neck mass has not changed.  Reports fatigue but he is very active and biked 30 miles yesterday and also worked in his yard.  No other new symptoms or problems.    Pain Status  Currently in Pain: No/denies    Review of Systems    As per the HPI.       Patient Coping     Distress Assessment  Distress Assessment Score: 1  Accompanied by  Accompanied by: Alone    Past History  Past Medical History:   Diagnosis Date     Lymphoma (H)          Past Surgical History:   Procedure Laterality Date     CYST REMOVAL      Scalp     PAROTIDECTOMY Right 7/28/2020    Procedure: excisional biopsy of right parotid lymphoma, 23 HOUR;  Surgeon: Kota Bradley MD;  Location: [facility];  Service: ENT     scalp surgery      lesion removal behind ear and cyst top of head      US BIOPSY FINE NEEDLE ASPIRATION LYMPH NODE (BREAST) LEFT Left 5/12/2020     US BIOPSY FINE NEEDLE ASPIRATION LYMPH NODE (BREAST) LEFT Left 5/12/2020       Physical Exam    Recent Vitals 6/7/2021   Height -   Weight 197 lbs   BSA (m2) 2.11 m2   /69   Pulse 70   Temp 98   Temp src 1   SpO2 97   Some recent data might be hidden        GENERAL: Alert and oriented to time place and person. Seated comfortably. In no distress.    HEAD: Atraumatic and normocephalic.    EYES: LOUIE, EOMI.  No pallor.  No icterus.    Oral cavity: no mucosal lesion or tonsillar enlargement.    NECK: supple. JVP normal.  No thyroid enlargement.  No mass or lymph node palpable in the parotid area    LYMPH NODES: No palpable, cervical, axillary or inguinal lymphadenopathy.    CHEST: clear to auscultation bilaterally.  Resonant to percussion throughout bilaterally.  Symmetrical breath movements bilaterally.    CVS: S1 and S2 are heard. Regular rate and rhythm.  No murmur or gallop or rub heard.  No peripheral edema.    ABDOMEN: Soft. Not tender. Not distended.  No palpable hepatomegaly or splenomegaly.  No other mass palpable.  Bowel sounds heard.    EXTREMITIES: Warm.    SKIN: no rash, or bruising or purpura.  Has a full head of hair.      Lab Results    No results found for this or any previous visit (from the past 168 hour(s)).    Imaging    Us Thyroid    Result Date: 6/2/2021  EXAM: US THYROID LOCATION: [facility] DATE/TIME: 6/2/2021 12:00 PM INDICATION: Follow-up thyroid nodules. FDG avid posterior mid left thyroid nodule on PET/CT. COMPARISON: PET/CT 8/13/2020 and thyroid ultrasound 5/12/2020 TECHNIQUE: Thyroid ultrasound. FINDINGS: RIGHT lobe: 7.1 x 3.5 x 2.2 cm. Homogeneous background echotexture. Multiple spongiform nodules and cysts redemonstrated. Isthmus: 9 mm. LEFT lobe: 7.7 x 3.9 x 3.4 cm. Homogeneous background echotexture. Multiple spongiform nodules redemonstrated. NECK: No cervical lymphadenopathy. NODULES: Nodule 1: Stable 2.5 x 2.0 x 2.0 cm hypoechoic posterior mid left thyroid nodule which corresponds to FDG avid nodule on PET/CT. Composition: Solid or almost completely solid, 2 points Echogenicity: Hypoechoic, 2 points Shape: Wider-than-tall, 0 points Margin: Smooth, 0 points Echogenic Foci: None, or large comet-tail  artifacts, 0 points Point Total: 4-6 points. TI-RADS 4. If 1.5 cm or larger, recommend FNA; if 1 cm or larger, follow up US (annually for 5 years).   Nodule 2: 2.5 x 2.0 x 1.6 cm isoechoic left isthmic nodule. Composition: Spongiform, 0 points Echogenicity: Hyperechoic or isoechoic, 1 point Shape: Wider-than-tall, 0 points Margin: Smooth, 0 points Echogenic Foci: None, or large comet-tail artifacts, 0 points Point Total: 1-2 points. TI-RADS 2. No FNA. Nodule 3: Stable 2.0 x 1.4 x 1.9 cm isoechoic lateral mid left thyroid nodule. Composition: Spongiform, 0 points Echogenicity: Hyperechoic or isoechoic, 1 point Shape: Wider-than-tall, 0 points Margin: Smooth, 0 points Echogenic Foci: None, or large comet-tail artifacts, 0 points Point Total: 1-2 points. TI-RADS 2. No FNA. Nodule 4: Multiple spongiform right thyroid nodules measuring 1.5 cm or less are not suspicious. Composition: Spongiform, 0 points Echogenicity: Hyperechoic or isoechoic, 1 point Shape: Wider-than-tall, 0 points Margin: Smooth, 0 points Echogenic Foci: None, or large comet-tail artifacts, 0 points Point Total: 1-2 points. TI-RADS 2. No FNA.     1.  Stable 2.5 cm TI RADS 4 nodule posterior mid left thyroid lobe corresponds to FDG avid nodule on PET/CT. The nodule meets criteria for ultrasound-guided FNA. If FNA not performed, consider follow-up ultrasound in one year. 2.  Multiple additional TI RADS 2 thyroid nodules bilaterally are not suspicious. Nodules are characterized per ACR Thyroid Imaging, Reporting and Data System (TI-RADS): White Paper of the ACR TI-RADS Committee Roland Rizvi. et al. Journal of the American College of Radiology 2017. Volume 14 (2017), Issue 5, 587-372.         Signed by: Tamika Torres MD

## 2021-06-26 NOTE — PROGRESS NOTES
"Oncology Rooming Note    06/07/21 12:51 PM    Dejan Zarco is a 65 y.o. male who presents for:    Chief Complaint   Patient presents with     HE Cancer       Initial Vitals: /69   Pulse 70   Temp 98  F (36.7  C) (Oral)   Wt 197 lb (89.4 kg)   SpO2 97%   BMI 27.67 kg/m       Estimated body mass index is 27.67 kg/m  as calculated from the following:    Height as of 12/18/20: 5' 10.75\" (1.797 m).    Weight as of this encounter: 197 lb (89.4 kg).     Body surface area is 2.11 meters squared.      Allergies reviewed: Yes  Medications reviewed: Yes    Refills needed: No  Pharmacy name entered into EPIC:       Clinical concerns: no concerns      Daysi Mane RN      "

## 2021-07-02 DIAGNOSIS — Z11.59 ENCOUNTER FOR SCREENING FOR OTHER VIRAL DISEASES: Primary | ICD-10-CM

## 2021-07-03 NOTE — ADDENDUM NOTE
Addendum Note by Kg Hinojosa DO at 5/18/2020 12:10 PM     Author: Kg Hinojosa DO Service: -- Author Type: Physician    Filed: 5/18/2020 12:10 PM Encounter Date: 5/18/2020 Status: Signed    : Kg Hinojosa DO (Physician)    Addended by: KG HINOJOSA on: 5/18/2020 12:10 PM        Modules accepted: Orders

## 2021-07-06 VITALS
DIASTOLIC BLOOD PRESSURE: 69 MMHG | OXYGEN SATURATION: 97 % | TEMPERATURE: 98 F | BODY MASS INDEX: 27.67 KG/M2 | WEIGHT: 197 LBS | HEART RATE: 70 BPM | SYSTOLIC BLOOD PRESSURE: 127 MMHG

## 2021-07-12 DIAGNOSIS — Z11.59 ENCOUNTER FOR SCREENING FOR OTHER VIRAL DISEASES: ICD-10-CM

## 2021-07-12 PROCEDURE — U0003 INFECTIOUS AGENT DETECTION BY NUCLEIC ACID (DNA OR RNA); SEVERE ACUTE RESPIRATORY SYNDROME CORONAVIRUS 2 (SARS-COV-2) (CORONAVIRUS DISEASE [COVID-19]), AMPLIFIED PROBE TECHNIQUE, MAKING USE OF HIGH THROUGHPUT TECHNOLOGIES AS DESCRIBED BY CMS-2020-01-R: HCPCS

## 2021-07-12 PROCEDURE — U0005 INFEC AGEN DETEC AMPLI PROBE: HCPCS

## 2021-07-13 LAB — SARS-COV-2 RNA RESP QL NAA+PROBE: NEGATIVE

## 2021-07-14 PROBLEM — C88.40: Status: RESOLVED | Noted: 2020-08-07 | Resolved: 2020-08-17

## 2021-07-14 PROBLEM — C85.91: Status: RESOLVED | Noted: 2020-06-02 | Resolved: 2020-08-07

## 2021-07-14 PROBLEM — C85.91: Chronic | Status: RESOLVED | Noted: 2020-07-09 | Resolved: 2021-03-02

## 2021-07-16 ENCOUNTER — HOSPITAL ENCOUNTER (OUTPATIENT)
Dept: ULTRASOUND IMAGING | Facility: HOSPITAL | Age: 66
Discharge: HOME | End: 2021-07-16
Attending: FAMILY MEDICINE | Admitting: RADIOLOGY
Payer: MEDICARE

## 2021-07-16 DIAGNOSIS — E04.1 THYROID NODULE: ICD-10-CM

## 2021-07-16 PROCEDURE — 88173 CYTOPATH EVAL FNA REPORT: CPT | Mod: TC | Performed by: FAMILY MEDICINE

## 2021-07-16 PROCEDURE — 10005 FNA BX W/US GDN 1ST LES: CPT

## 2021-07-21 LAB
PATH REPORT.COMMENTS IMP SPEC: NORMAL
PATH REPORT.COMMENTS IMP SPEC: NORMAL
PATH REPORT.FINAL DX SPEC: NORMAL
PATH REPORT.GROSS SPEC: NORMAL
PATH REPORT.MICROSCOPIC SPEC OTHER STN: NORMAL
PATH REPORT.RELEVANT HX SPEC: NORMAL

## 2021-07-21 PROCEDURE — 88172 CYTP DX EVAL FNA 1ST EA SITE: CPT | Mod: 26 | Performed by: PATHOLOGY

## 2021-07-21 PROCEDURE — 88173 CYTOPATH EVAL FNA REPORT: CPT | Mod: 26 | Performed by: PATHOLOGY

## 2021-07-23 DIAGNOSIS — D34 HURTHLE CELL METAPLASIA OF THYROID GLAND: Primary | ICD-10-CM

## 2021-07-27 NOTE — TELEPHONE ENCOUNTER
FUTURE VISIT INFORMATION      FUTURE VISIT INFORMATION:    Date: 8/23/21    Time: 2 PM    [place] [facility]  REFERRAL INFORMATION:    Referring provider:  Dr. 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 Juan Pablo Angela    [facility] 6/7/21 - HEM OV with Dr. Torres  8/20/20 - RAD ONC OV with Dr. Priest  7/8/20 - ENT OV with Dr. 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 Kirk Angela    [facility] - Imaging 7/16/21 - US Tyroid FNA  6/2/21 - US Thyroid  8/13/20 - PET  5/13/20 - US Partid FNA  5/13/20 - US Thyroid  5/5/20 - US Head Neck [facility] PACs

## 2021-08-23 ENCOUNTER — PRE VISIT (OUTPATIENT)
Dept: OTOLARYNGOLOGY | Facility: CLINIC | Age: 66
End: 2021-08-23

## 2021-08-27 ENCOUNTER — LAB (OUTPATIENT)
Dept: LAB | Facility: CLINIC | Age: 66
End: 2021-08-27
Payer: MEDICARE

## 2021-08-27 DIAGNOSIS — E04.1 THYROID NODULE: ICD-10-CM

## 2021-08-27 LAB — TSH SERPL DL<=0.005 MIU/L-ACNC: 0.82 UIU/ML (ref 0.3–5)

## 2021-08-27 PROCEDURE — 84443 ASSAY THYROID STIM HORMONE: CPT

## 2021-08-27 PROCEDURE — 36415 COLL VENOUS BLD VENIPUNCTURE: CPT

## 2021-09-25 ENCOUNTER — HEALTH MAINTENANCE LETTER (OUTPATIENT)
Age: 66
End: 2021-09-25

## 2021-10-25 ENCOUNTER — IMMUNIZATION (OUTPATIENT)
Dept: FAMILY MEDICINE | Facility: CLINIC | Age: 66
End: 2021-10-25
Payer: MEDICARE

## 2021-10-25 PROCEDURE — G0008 ADMIN INFLUENZA VIRUS VAC: HCPCS

## 2021-10-25 PROCEDURE — 90662 IIV NO PRSV INCREASED AG IM: CPT

## 2021-11-12 ENCOUNTER — OFFICE VISIT (OUTPATIENT)
Dept: FAMILY MEDICINE | Facility: CLINIC | Age: 66
End: 2021-11-12
Payer: MEDICARE

## 2021-11-12 VITALS
BODY MASS INDEX: 27.58 KG/M2 | HEART RATE: 66 BPM | HEIGHT: 71 IN | RESPIRATION RATE: 20 BRPM | SYSTOLIC BLOOD PRESSURE: 148 MMHG | WEIGHT: 197 LBS | DIASTOLIC BLOOD PRESSURE: 79 MMHG

## 2021-11-12 DIAGNOSIS — K11.8 PAROTID MASS: ICD-10-CM

## 2021-11-12 DIAGNOSIS — C43.9 MELANOMA OF SKIN (H): ICD-10-CM

## 2021-11-12 DIAGNOSIS — Z12.5 ENCOUNTER FOR SCREENING FOR MALIGNANT NEOPLASM OF PROSTATE: ICD-10-CM

## 2021-11-12 DIAGNOSIS — Z00.00 HEALTH CARE MAINTENANCE: ICD-10-CM

## 2021-11-12 DIAGNOSIS — Z23 HIGH PRIORITY FOR 2019-NCOV VACCINE: Primary | ICD-10-CM

## 2021-11-12 LAB
CHOLEST SERPL-MCNC: 187 MG/DL
ERYTHROCYTE [DISTWIDTH] IN BLOOD BY AUTOMATED COUNT: 12.1 % (ref 10–15)
FASTING STATUS PATIENT QL REPORTED: YES
HCT VFR BLD AUTO: 45.6 % (ref 40–53)
HDLC SERPL-MCNC: 46 MG/DL
HGB BLD-MCNC: 14.7 G/DL (ref 13.3–17.7)
LDLC SERPL CALC-MCNC: 125 MG/DL
MCH RBC QN AUTO: 28.3 PG (ref 26.5–33)
MCHC RBC AUTO-ENTMCNC: 32.2 G/DL (ref 31.5–36.5)
MCV RBC AUTO: 88 FL (ref 78–100)
PLATELET # BLD AUTO: 180 10E3/UL (ref 150–450)
PSA SERPL-MCNC: 2.78 UG/L (ref 0–4.5)
RBC # BLD AUTO: 5.19 10E6/UL (ref 4.4–5.9)
TRIGL SERPL-MCNC: 82 MG/DL
WBC # BLD AUTO: 5.5 10E3/UL (ref 4–11)

## 2021-11-12 PROCEDURE — 85027 COMPLETE CBC AUTOMATED: CPT | Performed by: FAMILY MEDICINE

## 2021-11-12 PROCEDURE — G0404 EKG TRACING FOR INITIAL PREV: HCPCS | Performed by: FAMILY MEDICINE

## 2021-11-12 PROCEDURE — 0004A COVID-19,PF,PFIZER (12+ YRS): CPT | Performed by: FAMILY MEDICINE

## 2021-11-12 PROCEDURE — 36415 COLL VENOUS BLD VENIPUNCTURE: CPT | Performed by: FAMILY MEDICINE

## 2021-11-12 PROCEDURE — 80061 LIPID PANEL: CPT | Performed by: FAMILY MEDICINE

## 2021-11-12 PROCEDURE — G0103 PSA SCREENING: HCPCS | Performed by: FAMILY MEDICINE

## 2021-11-12 PROCEDURE — 91300 COVID-19,PF,PFIZER (12+ YRS): CPT | Performed by: FAMILY MEDICINE

## 2021-11-12 PROCEDURE — G0405 EKG INTERPRET & REPORT PREVE: HCPCS | Mod: OFF | Performed by: INTERNAL MEDICINE

## 2021-11-12 PROCEDURE — 80053 COMPREHEN METABOLIC PANEL: CPT | Performed by: FAMILY MEDICINE

## 2021-11-12 PROCEDURE — G0402 INITIAL PREVENTIVE EXAM: HCPCS | Performed by: FAMILY MEDICINE

## 2021-11-12 RX ORDER — LORATADINE 10 MG/1
10 TABLET ORAL DAILY
COMMUNITY
End: 2023-02-08

## 2021-11-12 ASSESSMENT — ACTIVITIES OF DAILY LIVING (ADL): CURRENT_FUNCTION: NO ASSISTANCE NEEDED

## 2021-11-12 ASSESSMENT — MIFFLIN-ST. JEOR: SCORE: 1698.59

## 2021-11-12 NOTE — PROGRESS NOTES
"SUBJECTIVE:   Dejan Zarco is a 66 year old male who presents for Preventive Visit.  Patient is here for annual exam.  Patient has no acute concerns.  In the last year patient found a melanoma on his skin he is following with dermatology.  Parotid mass was removed with surgery-multiple pathologist with varying opinions on if there is non-Hodgkin's presence or not-eventually discussed that it was atypical cells but not non-Hodgkin's.  He is fasting interested in fasting blood work.  He is exercising on a regular basis.  We discussed Covid and the Covid vaccine.  He like to get the booster today.  He is interested in a baseline EKG and AAA screening which is offered with his welcome to Medicare exam.  Patient has been advised of split billing requirements and indicates understanding: Yes   Are you in the first 12 months of your Medicare coverage?  Yes,  Visual Acuity:  Right Eye: 10/8   Left Eye: 10/10  Both Eyes: 10/8    Healthy Habits:     In general, how would you rate your overall health?  Excellent    Frequency of exercise:  2-3 days/week    Duration of exercise:  15-30 minutes    Do you usually eat at least 4 servings of fruit and vegetables a day, include whole grains    & fiber and avoid regularly eating high fat or \"junk\" foods?  Yes    Taking medications regularly:  Yes    Medication side effects:  None    Ability to successfully perform activities of daily living:  No assistance needed    Home Safety:  Throw rugs in the hallway and lack of grab bars in the bathroom    Hearing Impairment:  No hearing concerns    In the past 6 months, have you been bothered by leaking of urine?  No    In general, how would you rate your overall mental or emotional health?  Excellent      PHQ-2 Total Score: 0    Additional concerns today:  No    Do you feel safe in your environment? Yes    Have you ever done Advance Care Planning? (For example, a Health Directive, POLST, or a discussion with a medical provider or your " loved ones about your wishes): Yes, patient states has an Advance Care Planning document and will bring a copy to the clinic.       Fall risk  Fallen 2 or more times in the past year?: No  Any fall with injury in the past year?: No    Cognitive Screening   1) Repeat 3 items (Leader, Season, Table)    2) Clock draw: NORMAL  3) 3 item recall: Recalls 2 objects   Results: NORMAL clock, 1-2 items recalled: COGNITIVE IMPAIRMENT LESS LIKELY    Mini-CogTM Copyright SOLA Browning. Licensed by the author for use in [facility]; reprinted with permission (butch@[facility]). All rights reserved.        Reviewed and updated as needed this visit by clinical staff  Tobacco  Allergies              Reviewed and updated as needed this visit by Provider               Social History     Tobacco Use     Smoking status: Never Smoker     Smokeless tobacco: Never Used   Substance Use Topics     Alcohol use: Yes     Comment: Alcoholic Drinks/day: 1-2/week         Alcohol Use 11/12/2021   Prescreen: >3 drinks/day or >7 drinks/week? No           Current providers sharing in care for this patient include:   Patient Care Team:  Ned Kwong MD as PCP - General  Berna Clark APRN CNP as Nurse Practitioner (Hematology & Oncology)  Tamika Torres MD as MD (Hematology & Oncology)  Doris Priest MD as MD (Hematology & Oncology)  Ned Kwong MD as Assigned PCP  Echo Sanchez, RN as Specialty Care Coordinator (Hematology & Oncology)  Kota Bradley MD as Assigned Surgical Provider  Tamika Torres MD as Assigned Cancer Care Provider    The following health maintenance items are reviewed in Epic and correct as of today:  Health Maintenance Due   Topic Date Due     ANNUAL REVIEW OF HM ORDERS  Never done     ADVANCE CARE PLANNING  Never done     Pneumococcal Vaccine: Pediatrics (0 to 5 Years) and At-Risk Patients (6 to 64 Years) (1 of 4 - PCV13) Never done     Pneumococcal Vaccine: 65+ Years (1 of 4 - PCV13)  "Never done     HIV SCREENING  Never done     HEPATITIS C SCREENING  Never done     AORTIC ANEURYSM SCREENING (SYSTEM ASSIGNED)  Never done     COVID-19 Vaccine (3 - Booster for Pfizer series) 10/26/2021     Lab work is in process  3rd pfizer vaccine today  Pneumo 23 in a 1-2 months        Review of Systems  Constitutional, HEENT, cardiovascular, pulmonary, gi and gu systems are negative, except as otherwise noted.    OBJECTIVE:   BP (!) 144/79 (BP Location: Left arm, Patient Position: Sitting, Cuff Size: Adult Large)   Pulse 67   Resp 20   Ht 1.8 m (5' 10.87\")   Wt 89.4 kg (197 lb)   BMI 27.58 kg/m   Estimated body mass index is 27.58 kg/m  as calculated from the following:    Height as of this encounter: 1.8 m (5' 10.87\").    Weight as of this encounter: 89.4 kg (197 lb).  Physical Exam  GENERAL: healthy, alert and no distress  EYES: Eyes grossly normal to inspection, PERRL and conjunctivae and sclerae normal  HENT: ear canals and TM's normal, nose and mouth without ulcers or lesions  RESP: lungs clear to auscultation - no rales, rhonchi or wheezes  CV: regular rate and rhythm, normal S1 S2, no S3 or S4, no murmur, click or rub, no peripheral edema and peripheral pulses strong  ABDOMEN: bowel sounds normal  MS: no gross musculoskeletal defects noted, no edema  NEURO: Normal strength and tone, mentation intact and speech normal  PSYCH: mentation appears normal, affect normal/bright    Diagnostic Test Results:  Labs reviewed in Epic    ASSESSMENT / PLAN:   Dejan was seen today for medicare visit and imm/inj.    Diagnoses and all orders for this visit:    High priority for 2019-nCoV vaccine  Updated today  Health care maintenance  -     CBC with platelets; Future  -     Comprehensive metabolic panel; Future  -     Lipid panel reflex to direct LDL Fasting; Future  -     Prostate Specific Antigen Screen; Future  -     EKG 12-lead, tracing only  -     US Abdominal Aorta Imaging; Future  -     CBC with platelets  - " "    Lipid panel reflex to direct LDL Fasting  -     Prostate Specific Antigen Screen  Discussed labs and following up based on results  Discussed EKG for baseline per chart  Discussed AAA screening  Encounter for screening for malignant neoplasm of prostate   -     Prostate Specific Antigen Screen; Future  -     Prostate Specific Antigen Screen  Screen for prostate cancer  Parotid mass  Surgical removed  Atypical cells but not malignant  Melanoma of skin (H)  Following with dermatology   other orders  -     COVID-19,PF,PFIZER (12+ Yrs PURPLE LABEL)  -     PPSV23, IM/SUBQ (2+ YRS) - [id number]; Future        Patient has been advised of split billing requirements and indicates understanding: Yes  COUNSELING:  Reviewed preventive health counseling, as reflected in patient instructions       Consider AAA screening for ages 65-75 and smoking history       Regular exercise       Healthy diet/nutrition    Estimated body mass index is 27.58 kg/m  as calculated from the following:    Height as of this encounter: 1.8 m (5' 10.87\").    Weight as of this encounter: 89.4 kg (197 lb).    Weight management plan: Discussed healthy diet and exercise guidelines    He reports that he has never smoked. He has never used smokeless tobacco.      Appropriate preventive services were discussed with this patient, including applicable screening as appropriate for cardiovascular disease, diabetes, osteopenia/osteoporosis, and glaucoma.  As appropriate for age/gender, discussed screening for colorectal cancer, prostate cancer, breast cancer, and cervical cancer. Checklist reviewing preventive services available has been given to the patient.    Reviewed patients plan of care and provided an AVS. The Basic Care Plan (routine screening as documented in Health Maintenance) for Dejan meets the Care Plan requirement. This Care Plan has been established and reviewed with the Patient.    Counseling Resources:  ATP IV Guidelines  Pooled Cohorts " Equation Calculator  Breast Cancer Risk Calculator  Breast Cancer: Medication to Reduce Risk  FRAX Risk Assessment  ICSI Preventive Guidelines  Dietary Guidelines for Americans, 2010  [employer]'s MyPlate  ASA Prophylaxis  Lung CA Screening    Ned Kwong MD  [facility]    Identified Health Risks:

## 2021-11-15 LAB
ATRIAL RATE - MUSE: 65 BPM
DIASTOLIC BLOOD PRESSURE - MUSE: NORMAL MMHG
INTERPRETATION ECG - MUSE: NORMAL
P AXIS - MUSE: 32 DEGREES
PR INTERVAL - MUSE: 206 MS
QRS DURATION - MUSE: 90 MS
QT - MUSE: 412 MS
QTC - MUSE: 428 MS
R AXIS - MUSE: -3 DEGREES
SYSTOLIC BLOOD PRESSURE - MUSE: NORMAL MMHG
T AXIS - MUSE: 37 DEGREES
VENTRICULAR RATE- MUSE: 65 BPM

## 2021-11-16 LAB
ALBUMIN SERPL-MCNC: 4.1 G/DL (ref 3.5–5)
ALP SERPL-CCNC: 68 U/L (ref 45–120)
ALT SERPL W P-5'-P-CCNC: 14 U/L (ref 0–45)
ANION GAP SERPL CALCULATED.3IONS-SCNC: 8 MMOL/L (ref 5–18)
AST SERPL W P-5'-P-CCNC: 15 U/L (ref 0–40)
BILIRUB SERPL-MCNC: 1 MG/DL (ref 0–1)
BUN SERPL-MCNC: 19 MG/DL (ref 8–22)
CALCIUM SERPL-MCNC: 9.8 MG/DL (ref 8.5–10.5)
CHLORIDE BLD-SCNC: 107 MMOL/L (ref 98–107)
CO2 SERPL-SCNC: 26 MMOL/L (ref 22–31)
CREAT SERPL-MCNC: 0.85 MG/DL (ref 0.7–1.3)
GFR SERPL CREATININE-BSD FRML MDRD: >90 ML/MIN/1.73M2
GLUCOSE BLD-MCNC: 97 MG/DL (ref 70–125)
POTASSIUM BLD-SCNC: 4.6 MMOL/L (ref 3.5–5)
PROT SERPL-MCNC: 6.8 G/DL (ref 6–8)
SODIUM SERPL-SCNC: 141 MMOL/L (ref 136–145)

## 2021-11-29 ENCOUNTER — HOSPITAL ENCOUNTER (OUTPATIENT)
Dept: ULTRASOUND IMAGING | Facility: HOSPITAL | Age: 66
Discharge: HOME | End: 2021-11-29
Attending: FAMILY MEDICINE | Admitting: FAMILY MEDICINE
Payer: MEDICARE

## 2021-11-29 DIAGNOSIS — Z00.00 HEALTH CARE MAINTENANCE: ICD-10-CM

## 2021-11-29 PROCEDURE — 76775 US EXAM ABDO BACK WALL LIM: CPT

## 2021-12-29 ENCOUNTER — ALLIED HEALTH/NURSE VISIT (OUTPATIENT)
Dept: FAMILY MEDICINE | Facility: CLINIC | Age: 66
End: 2021-12-29
Payer: MEDICARE

## 2021-12-29 DIAGNOSIS — Z23 NEED FOR VACCINATION: Primary | ICD-10-CM

## 2021-12-29 PROCEDURE — 99207 PR NO CHARGE NURSE ONLY: CPT

## 2021-12-29 PROCEDURE — 90732 PPSV23 VACC 2 YRS+ SUBQ/IM: CPT

## 2021-12-29 PROCEDURE — G0009 ADMIN PNEUMOCOCCAL VACCINE: HCPCS

## 2021-12-29 NOTE — PROGRESS NOTES
Prior to immunization administration, verified patients identity using patient s name and date of birth. Please see Immunization Activity for additional information.     Screening Questionnaire for Adult Immunization    Are you sick today?   No   Do you have allergies to medications, food, a vaccine component or latex?   No   Have you ever had a serious reaction after receiving a vaccination?   No   Do you have a long-term health problem with heart, lung, kidney, or metabolic disease (e.g., diabetes), asthma, a blood disorder, no spleen, complement component deficiency, a cochlear implant, or a spinal fluid leak?  Are you on long-term aspirin therapy?   No   Do you have cancer, leukemia, HIV/AIDS, or any other immune system problem?   No   Do you have a parent, brother, or sister with an immune system problem?   No   In the past 3 months, have you taken medications that affect  your immune system, such as prednisone, other steroids, or anticancer drugs; drugs for the treatment of rheumatoid arthritis, Crohn s disease, or psoriasis; or have you had radiation treatments?   No   Have you had a seizure, or a brain or other nervous system problem?   No   During the past year, have you received a transfusion of blood or blood    products, or been given immune (gamma) globulin or antiviral drug?   No   For women: Are you pregnant or is there a chance you could become       pregnant during the next month?   No   Have you received any vaccinations in the past 4 weeks?   No     Immunization questionnaire answers were all negative.        Per orders of Dr. Hilliard, injection of  pheumonia 23 given by Rayna Owusu. Patient instructed to remain in clinic for 15 minutes afterwards, and to report any adverse reaction to me immediately.       Screening performed by Rayna Owusu on 12/29/2021 at 11:46 AM.

## 2022-04-25 ENCOUNTER — IMMUNIZATION (OUTPATIENT)
Dept: NURSING | Facility: CLINIC | Age: 67
End: 2022-04-25
Payer: MEDICARE

## 2022-04-25 PROCEDURE — 91305 COVID-19,PF,PFIZER (12+ YRS): CPT

## 2022-04-25 PROCEDURE — 0054A COVID-19,PF,PFIZER (12+ YRS): CPT

## 2022-08-01 ENCOUNTER — HOSPITAL ENCOUNTER (OUTPATIENT)
Dept: ULTRASOUND IMAGING | Facility: HOSPITAL | Age: 67
Discharge: HOME | End: 2022-08-01
Attending: FAMILY MEDICINE | Admitting: FAMILY MEDICINE
Payer: MEDICARE

## 2022-08-01 DIAGNOSIS — E04.1 THYROID NODULE: ICD-10-CM

## 2022-08-01 PROCEDURE — 76536 US EXAM OF HEAD AND NECK: CPT

## 2022-08-04 DIAGNOSIS — E04.1 THYROID NODULE: Primary | ICD-10-CM

## 2022-10-27 ENCOUNTER — IMMUNIZATION (OUTPATIENT)
Dept: FAMILY MEDICINE | Facility: CLINIC | Age: 67
End: 2022-10-27
Payer: MEDICARE

## 2022-10-27 PROCEDURE — 90662 IIV NO PRSV INCREASED AG IM: CPT

## 2022-10-27 PROCEDURE — G0008 ADMIN INFLUENZA VIRUS VAC: HCPCS

## 2022-12-05 ENCOUNTER — IMMUNIZATION (OUTPATIENT)
Dept: FAMILY MEDICINE | Facility: CLINIC | Age: 67
End: 2022-12-05
Payer: MEDICARE

## 2022-12-05 PROCEDURE — 0124A COVID-19 VACCINE BIVALENT BOOSTER 12+ (PFIZER): CPT

## 2022-12-05 PROCEDURE — 91312 COVID-19 VACCINE BIVALENT BOOSTER 12+ (PFIZER): CPT

## 2023-02-07 ASSESSMENT — ENCOUNTER SYMPTOMS
SHORTNESS OF BREATH: 0
PARESTHESIAS: 0
ABDOMINAL PAIN: 0
EYE PAIN: 0
FEVER: 0
COUGH: 0
HEMATOCHEZIA: 0
HEADACHES: 0
CHILLS: 0
DIZZINESS: 0
ARTHRALGIAS: 0
MYALGIAS: 0
NERVOUS/ANXIOUS: 0
FREQUENCY: 0
NAUSEA: 0
WEAKNESS: 0
DIARRHEA: 0
SORE THROAT: 0
HEMATURIA: 0
CONSTIPATION: 0
PALPITATIONS: 0
JOINT SWELLING: 0
HEARTBURN: 0
DYSURIA: 0

## 2023-02-07 ASSESSMENT — ACTIVITIES OF DAILY LIVING (ADL): CURRENT_FUNCTION: NO ASSISTANCE NEEDED

## 2023-02-08 ENCOUNTER — OFFICE VISIT (OUTPATIENT)
Dept: FAMILY MEDICINE | Facility: CLINIC | Age: 68
End: 2023-02-08
Payer: MEDICARE

## 2023-02-08 ENCOUNTER — MYC MEDICAL ADVICE (OUTPATIENT)
Dept: FAMILY MEDICINE | Facility: CLINIC | Age: 68
End: 2023-02-08

## 2023-02-08 VITALS
HEIGHT: 71 IN | TEMPERATURE: 98.2 F | DIASTOLIC BLOOD PRESSURE: 67 MMHG | BODY MASS INDEX: 27.86 KG/M2 | OXYGEN SATURATION: 99 % | HEART RATE: 58 BPM | RESPIRATION RATE: 16 BRPM | WEIGHT: 199 LBS | SYSTOLIC BLOOD PRESSURE: 147 MMHG

## 2023-02-08 DIAGNOSIS — E78.2 MIXED HYPERLIPIDEMIA: Primary | ICD-10-CM

## 2023-02-08 DIAGNOSIS — Z12.5 SCREENING FOR PROSTATE CANCER: ICD-10-CM

## 2023-02-08 DIAGNOSIS — Z00.00 HEALTH CARE MAINTENANCE: Primary | ICD-10-CM

## 2023-02-08 DIAGNOSIS — Z85.72 HISTORY OF LYMPHOMA: ICD-10-CM

## 2023-02-08 DIAGNOSIS — C43.9 MELANOMA OF SKIN (H): ICD-10-CM

## 2023-02-08 DIAGNOSIS — I10 ESSENTIAL HYPERTENSION: ICD-10-CM

## 2023-02-08 LAB
ALBUMIN SERPL BCG-MCNC: 4.2 G/DL (ref 3.5–5.2)
ALP SERPL-CCNC: 64 U/L (ref 40–129)
ALT SERPL W P-5'-P-CCNC: 17 U/L (ref 10–50)
ANION GAP SERPL CALCULATED.3IONS-SCNC: 7 MMOL/L (ref 7–15)
AST SERPL W P-5'-P-CCNC: 23 U/L (ref 10–50)
BILIRUB SERPL-MCNC: 0.7 MG/DL
BUN SERPL-MCNC: 19.5 MG/DL (ref 8–23)
CALCIUM SERPL-MCNC: 9.3 MG/DL (ref 8.8–10.2)
CHLORIDE SERPL-SCNC: 106 MMOL/L (ref 98–107)
CHOLEST SERPL-MCNC: 172 MG/DL
CREAT SERPL-MCNC: 0.81 MG/DL (ref 0.67–1.17)
DEPRECATED HCO3 PLAS-SCNC: 27 MMOL/L (ref 22–29)
ERYTHROCYTE [DISTWIDTH] IN BLOOD BY AUTOMATED COUNT: 12.4 % (ref 10–15)
GFR SERPL CREATININE-BSD FRML MDRD: >90 ML/MIN/1.73M2
GLUCOSE SERPL-MCNC: 101 MG/DL (ref 70–99)
HCT VFR BLD AUTO: 41.5 % (ref 40–53)
HDLC SERPL-MCNC: 43 MG/DL
HGB BLD-MCNC: 13.5 G/DL (ref 13.3–17.7)
LDLC SERPL CALC-MCNC: 106 MG/DL
MCH RBC QN AUTO: 29.3 PG (ref 26.5–33)
MCHC RBC AUTO-ENTMCNC: 32.5 G/DL (ref 31.5–36.5)
MCV RBC AUTO: 90 FL (ref 78–100)
NONHDLC SERPL-MCNC: 129 MG/DL
PLATELET # BLD AUTO: 168 10E3/UL (ref 150–450)
POTASSIUM SERPL-SCNC: 4.8 MMOL/L (ref 3.4–5.3)
PROT SERPL-MCNC: 6.7 G/DL (ref 6.4–8.3)
PSA SERPL-MCNC: 2.97 NG/ML (ref 0–4.5)
RBC # BLD AUTO: 4.61 10E6/UL (ref 4.4–5.9)
SODIUM SERPL-SCNC: 140 MMOL/L (ref 136–145)
TRIGL SERPL-MCNC: 117 MG/DL
WBC # BLD AUTO: 5.1 10E3/UL (ref 4–11)

## 2023-02-08 PROCEDURE — 99214 OFFICE O/P EST MOD 30 MIN: CPT | Mod: 25 | Performed by: FAMILY MEDICINE

## 2023-02-08 PROCEDURE — 99397 PER PM REEVAL EST PAT 65+ YR: CPT | Performed by: FAMILY MEDICINE

## 2023-02-08 PROCEDURE — 36415 COLL VENOUS BLD VENIPUNCTURE: CPT | Performed by: FAMILY MEDICINE

## 2023-02-08 PROCEDURE — 80053 COMPREHEN METABOLIC PANEL: CPT | Performed by: FAMILY MEDICINE

## 2023-02-08 PROCEDURE — G0103 PSA SCREENING: HCPCS | Performed by: FAMILY MEDICINE

## 2023-02-08 PROCEDURE — 85027 COMPLETE CBC AUTOMATED: CPT | Performed by: FAMILY MEDICINE

## 2023-02-08 PROCEDURE — 80061 LIPID PANEL: CPT | Performed by: FAMILY MEDICINE

## 2023-02-08 RX ORDER — LISINOPRIL 10 MG/1
10 TABLET ORAL DAILY
Qty: 90 TABLET | Refills: 3 | Status: SHIPPED | OUTPATIENT
Start: 2023-02-08 | End: 2024-02-02

## 2023-02-08 ASSESSMENT — ENCOUNTER SYMPTOMS
CHILLS: 0
HEADACHES: 0
DYSURIA: 0
HEARTBURN: 0
FREQUENCY: 0
FEVER: 0
PARESTHESIAS: 0
NAUSEA: 0
ABDOMINAL PAIN: 0
MYALGIAS: 0
DIZZINESS: 0
SORE THROAT: 0
NERVOUS/ANXIOUS: 0
JOINT SWELLING: 0
EYE PAIN: 0
SHORTNESS OF BREATH: 0
CONSTIPATION: 0
ARTHRALGIAS: 0
WEAKNESS: 0
COUGH: 0
HEMATURIA: 0
PALPITATIONS: 0
HEMATOCHEZIA: 0
DIARRHEA: 0

## 2023-02-08 ASSESSMENT — ACTIVITIES OF DAILY LIVING (ADL): CURRENT_FUNCTION: NO ASSISTANCE NEEDED

## 2023-02-08 NOTE — PROGRESS NOTES
"SUBJECTIVE:   Harry is a 67 year old who presents for Preventive Visit.  Patient has been advised of split billing requirements and indicates understanding: Yes  Are you in the first 12 months of your Medicare coverage?  No    Healthy Habits:     In general, how would you rate your overall health?  Excellent    Frequency of exercise:  4-5 days/week    Duration of exercise:  Greater than 60 minutes    Do you usually eat at least 4 servings of fruit and vegetables a day, include whole grains    & fiber and avoid regularly eating high fat or \"junk\" foods?  Yes    Taking medications regularly:  Yes    Medication side effects:  None    Ability to successfully perform activities of daily living:  No assistance needed    Home Safety:  Throw rugs in the hallway and lack of grab bars in the bathroom    Hearing Impairment:  No hearing concerns    In the past 6 months, have you been bothered by leaking of urine?  No    In general, how would you rate your overall mental or emotional health?  Excellent      PHQ-2 Total Score: 0    Additional concerns today:  No  Patient is here for annual exam.  Patient is fasting interested in fasting blood work.  We will screen for prostate cancer with PSA.  Patient is following with dermatology for this history of melanoma x2.  Blood pressure is elevated similar to prior year.  We discussed starting low-dose lisinopril and rechecking blood pressure in a couple weeks.  Patient is due to get a pneumonia 20 vaccine however we are out at clinic he will attempt to get this at the [facility] closer to his home.  He is followed with oncology in the past for his past history of lymphoma of the parotid gland.  He is exercising regularly in the winter months with cross-country skiing walking in the summer.  He has found some difficulty with some word finding periodically we discussed the importance of regular physical activity but also mental activities as well he does do a lot of reading puzzles " and sudoku.    Have you ever done Advance Care Planning? (For example, a Health Directive, POLST, or a discussion with a medical provider or your loved ones about your wishes): Yes, patient states has an Advance Care Planning document and will bring a copy to the clinic.      Fall risk  Fallen 2 or more times in the past year?: No  Any fall with injury in the past year?: No    Cognitive Screening   1) Repeat 3 items (Leader, Season, Table)    2) Clock draw: NORMAL  3) 3 item recall: Recalls 3 objects  Results: 3 items recalled: COGNITIVE IMPAIRMENT LESS LIKELY    Mini-CogTM Copyright S Nallely. Licensed by the author for use in [facility]; reprinted with permission (soob@[facility]). All rights reserved.      Do you have sleep apnea, excessive snoring or daytime drowsiness?: no    Reviewed and updated as needed this visit by clinical staff    Allergies  Meds              Reviewed and updated as needed this visit by Provider                 Social History     Tobacco Use     Smoking status: Never     Smokeless tobacco: Never   Substance Use Topics     Alcohol use: Yes     Comment: Alcoholic Drinks/day: 1-2/week         Alcohol Use 2/7/2023   Prescreen: >3 drinks/day or >7 drinks/week? No           Current providers sharing in care for this patient include:   Patient Care Team:  Ned Kwong MD as PCP - General (Family Medicine)  Berna Clark APRN CNP as Nurse Practitioner (Hematology & Oncology)  Tamika Torres MD as MD (Hematology & Oncology)  Doris Priest MD as MD (Hematology & Oncology)  Ned Kwong MD as Assigned PCP  Echo Sanchez, RN as Specialty Care Coordinator (Hematology & Oncology)    The following health maintenance items are reviewed in Epic and correct as of today:  Health Maintenance   Topic Date Due     ANNUAL REVIEW OF HM ORDERS  Never done     HEPATITIS C SCREENING  Never done     MEDICARE ANNUAL WELLNESS VISIT  11/12/2022     Pneumococcal Vaccine: 65+  "Years (2 - PCV) 12/29/2022     FALL RISK ASSESSMENT  02/08/2024     LIPID  11/12/2026     ADVANCE CARE PLANNING  11/12/2026     COLORECTAL CANCER SCREENING  11/14/2026     DTAP/TDAP/TD IMMUNIZATION (2 - Td or Tdap) 02/13/2029     PHQ-2 (once per calendar year)  Completed     INFLUENZA VACCINE  Completed     ZOSTER IMMUNIZATION  Completed     AORTIC ANEURYSM SCREENING (SYSTEM ASSIGNED)  Completed     COVID-19 Vaccine  Completed     IPV IMMUNIZATION  Aged Out     MENINGITIS IMMUNIZATION  Aged Out     Lab work is in process  Labs reviewed in EPIC  Up-to-date on immunizations and colon cancer screening        Review of Systems   Constitutional: Negative for chills and fever.   HENT: Negative for congestion, ear pain, hearing loss and sore throat.    Eyes: Negative for pain and visual disturbance.   Respiratory: Negative for cough and shortness of breath.    Cardiovascular: Negative for chest pain, palpitations and peripheral edema.   Gastrointestinal: Negative for abdominal pain, constipation, diarrhea, heartburn, hematochezia and nausea.   Genitourinary: Negative for dysuria, frequency, genital sores, hematuria, impotence, penile discharge and urgency.   Musculoskeletal: Negative for arthralgias, joint swelling and myalgias.   Skin: Negative for rash.   Neurological: Negative for dizziness, weakness, headaches and paresthesias.   Psychiatric/Behavioral: Negative for mood changes. The patient is not nervous/anxious.      Constitutional, HEENT, cardiovascular, pulmonary, gi and gu systems are negative, except as otherwise noted.    OBJECTIVE:   There were no vitals taken for this visit. Estimated body mass index is 27.58 kg/m  as calculated from the following:    Height as of 11/12/21: 1.8 m (5' 10.87\").    Weight as of 11/12/21: 89.4 kg (197 lb).  Physical Exam  GENERAL: healthy, alert and no distress  EYES: Eyes grossly normal to inspection, PERRL and conjunctivae and sclerae normal  HENT: ear canals and TM's normal, " nose and mouth without ulcers or lesions  RESP: lungs clear to auscultation - no rales, rhonchi or wheezes  CV: regular rate and rhythm, normal S1 S2, no S3 or S4, no murmur, click or rub, no peripheral edema and peripheral pulses strong  ABDOMEN: bowel sounds normal  MS: no gross musculoskeletal defects noted, no edema  NEURO: Normal strength and tone, mentation intact and speech normal  PSYCH: mentation appears normal, affect normal/bright    Diagnostic Test Results:  Labs reviewed in Epic    ASSESSMENT / PLAN:   Dejan was seen today for wellness visit.    Diagnoses and all orders for this visit:    Health care maintenance  -     CBC with platelets; Future  -     Comprehensive metabolic panel; Future  -     Lipid panel reflex to direct LDL Fasting; Future  -     Prostate Specific Antigen Screen; Future  Obtain blood work today and follow-up based on results  Screening for prostate cancer  -     Prostate Specific Antigen Screen; Future  Continue screen for prostate cancer with PSA  Melanoma of skin (H)  Patient follow with dermatology with history of melanoma x2  Essential hypertension  -     lisinopril (ZESTRIL) 10 MG tablet; Take 1 tablet (10 mg) by mouth daily  Blood pressure elevated discussed starting lisinopril rechecking blood pressure with a nurse visit in a couple weeks  History of lymphoma  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 He has never used smokeless tobacco.      Appropriate preventive services were discussed with this patient, including applicable screening as  appropriate for cardiovascular disease, diabetes, osteopenia/osteoporosis, and glaucoma.  As appropriate for age/gender, discussed screening for colorectal cancer, prostate cancer, breast cancer, and cervical cancer. Checklist reviewing preventive services available has been given to the patient.    Reviewed patients plan of care and provided an AVS. The Basic Care Plan (routine screening as documented in Health Maintenance) for Dejan meets the Care Plan requirement. This Care Plan has been established and reviewed with the Patient.      Ned Kwong MD  [facility]    Identified Health Risks:

## 2023-02-09 RX ORDER — ATORVASTATIN CALCIUM 20 MG/1
20 TABLET, FILM COATED ORAL DAILY
Qty: 90 TABLET | Refills: 3 | Status: SHIPPED | OUTPATIENT
Start: 2023-02-09 | End: 2024-02-02

## 2023-04-25 NOTE — PROGRESS NOTES
Prior to immunization administration, verified patients identity using patient s name and date of birth. Please see Immunization Activity for additional information.     Screening Questionnaire for Adult Immunization    Are you sick today?   No   Do you have allergies to medications, food, a vaccine component or latex?   No   Have you ever had a serious reaction after receiving a vaccination?   No   Do you have a long-term health problem with heart, lung, kidney, or metabolic disease (e.g., diabetes), asthma, a blood disorder, no spleen, complement component deficiency, a cochlear implant, or a spinal fluid leak?  Are you on long-term aspirin therapy?   No   Do you have cancer, leukemia, HIV/AIDS, or any other immune system problem?   No   Do you have a parent, brother, or sister with an immune system problem?   No   In the past 3 months, have you taken medications that affect  your immune system, such as prednisone, other steroids, or anticancer drugs; drugs for the treatment of rheumatoid arthritis, Crohn s disease, or psoriasis; or have you had radiation treatments?   No   Have you had a seizure, or a brain or other nervous system problem?   No   During the past year, have you received a transfusion of blood or blood    products, or been given immune (gamma) globulin or antiviral drug?   No   For women: Are you pregnant or is there a chance you could become       pregnant during the next month?   No   Have you received any vaccinations in the past 4 weeks?   No     Immunization questionnaire answers were all negative.    I have reviewed the following standing orders: This patient is due and qualifies for the Pneumococcal vaccine.    Click here for Pneumococcal (Adult) Standing Order    I have reviewed the vaccines inclusion and exclusion criteria;No concerns regarding eligibility.         Injection of PCV20 given by Juan Carrasco Jr. FRANCI. Patient instructed to remain in clinic for 15 minutes afterwards, and to  report any adverse reactions.     Screening performed by Juan Carrasco Jr. on 4/26/2023 at 11:15 AM.

## 2023-04-26 ENCOUNTER — ALLIED HEALTH/NURSE VISIT (OUTPATIENT)
Dept: FAMILY MEDICINE | Facility: CLINIC | Age: 68
End: 2023-04-26
Payer: MEDICARE

## 2023-04-26 ENCOUNTER — TELEPHONE (OUTPATIENT)
Dept: FAMILY MEDICINE | Facility: CLINIC | Age: 68
End: 2023-04-26

## 2023-04-26 VITALS
HEART RATE: 81 BPM | DIASTOLIC BLOOD PRESSURE: 75 MMHG | OXYGEN SATURATION: 98 % | RESPIRATION RATE: 12 BRPM | SYSTOLIC BLOOD PRESSURE: 131 MMHG

## 2023-04-26 DIAGNOSIS — Z23 ENCOUNTER FOR IMMUNIZATION: Primary | ICD-10-CM

## 2023-04-26 DIAGNOSIS — I10 ESSENTIAL HYPERTENSION: ICD-10-CM

## 2023-04-26 PROCEDURE — 90677 PCV20 VACCINE IM: CPT

## 2023-04-26 PROCEDURE — 99207 PR NO CHARGE NURSE ONLY: CPT

## 2023-04-26 PROCEDURE — G0009 ADMIN PNEUMOCOCCAL VACCINE: HCPCS

## 2023-04-26 NOTE — TELEPHONE ENCOUNTER
Called and informed patient of message below. Patient states understanding.  Patient is wondering if either the atorvastatin or lisinopril can cause fatigue. Patient states that he has been feeling tired lately and that's not normal for him since he is normally really active.    Please advise        Ned Kwong MD at 4/26/2023 11:00 AM    Status: Signed   Blood pressure at goal range -continue with current medications.  We will continue to monitor.

## 2023-04-26 NOTE — TELEPHONE ENCOUNTER
Please inform patient that fatigue is not a typical side effect for lisinopril or simvastatin.  Obviously any individual can have varied reactions to different medications-but this is not what we typically see with those 2 medications.

## 2023-04-26 NOTE — PROGRESS NOTES
I met with Dejan Zarco at the request of Dr. Kwong to recheck his blood pressure.  Blood pressure medications on the med list were reviewed with patient.    Patient has taken all medications as per usual regimen: Yes  Patient reports tolerating them without any issues or concerns: Yes    Vitals:    04/26/23 1117   BP: 131/75   BP Location: Left arm   Patient Position: Sitting   Cuff Size: Adult Large   Pulse: 81   Resp: 12   SpO2: 98%       Blood pressure was taken, previous encounter was reviewed, recorded blood pressure below 140/90.  Patient was discharged and the note will be sent to the provider for final review.

## 2023-08-01 ENCOUNTER — HOSPITAL ENCOUNTER (OUTPATIENT)
Dept: ULTRASOUND IMAGING | Facility: HOSPITAL | Age: 68
Discharge: HOME | End: 2023-08-01
Attending: FAMILY MEDICINE | Admitting: FAMILY MEDICINE
Payer: MEDICARE

## 2023-08-01 DIAGNOSIS — E04.1 THYROID NODULE: ICD-10-CM

## 2023-08-01 PROCEDURE — 76536 US EXAM OF HEAD AND NECK: CPT

## 2023-10-12 ENCOUNTER — NURSE TRIAGE (OUTPATIENT)
Dept: NURSING | Facility: CLINIC | Age: 68
End: 2023-10-12
Payer: MEDICARE

## 2023-10-12 DIAGNOSIS — U07.1 INFECTION DUE TO 2019 NOVEL CORONAVIRUS: Primary | ICD-10-CM

## 2023-10-12 NOTE — TELEPHONE ENCOUNTER
Patient calling, states he has COVID. No fever, but he felt hot last night. He recently returned from a trip to [place]. Sats 93% on room air during the call. Patient states that this is around his baseline.     COVID Positive/Requesting COVID treatment    Patient is positive for COVID and requesting treatment options.    Date of positive COVID test (PCR or at home)? 10/12/2023  Current COVID symptoms: fever or chills, cough, fatigue, muscle or body aches, sore throat, and congestion or runny nose  Date COVID symptoms began: 10/10/2023    Message should be routed to clinic RN pool. Best phone number to use for call back: [phone], OK to leave a detailed message.     Rachel Hoover RN  [facility]  October 12, 2023, 3:12 PM    Reason for Disposition   HIGH RISK patient (e.g., weak immune system, age > 64 years, obesity with BMI of 30 or higher, pregnant, chronic lung disease or other chronic medical condition) and COVID symptoms (e.g., cough, fever)  (Exceptions: Already seen by doctor or NP/PA and no new or worsening symptoms.)    Additional Information   Negative: SEVERE difficulty breathing (e.g., struggling for each breath, speaks in single words)   Negative: Difficult to awaken or acting confused (e.g., disoriented, slurred speech)   Negative: Bluish (or gray) lips or face now   Negative: Shock suspected (e.g., cold/pale/clammy skin, too weak to stand, low BP, rapid pulse)   Negative: Sounds like a life-threatening emergency to the triager   Negative: Diagnosed or suspected COVID-19 and symptoms lasting 3 or more weeks   Negative: COVID-19 exposure and no symptoms   Negative: COVID-19 vaccine reaction suspected (e.g., fever, headache, muscle aches) occurring 1 to 3 days after getting vaccine   Negative: COVID-19 vaccine, questions about   Negative: Lives with someone known to have influenza (flu test positive) and flu-like symptoms (e.g., cough, runny nose, sore throat, SOB; with or without  fever)   Negative: Possible COVID-19 symptoms and triager concerned about severity of symptoms or other causes   Negative: COVID-19 and breastfeeding, questions about   Negative: SEVERE or constant chest pain or pressure  (Exception: Mild central chest pain, present only when coughing.)   Negative: MODERATE difficulty breathing (e.g., speaks in phrases, SOB even at rest, pulse 100-120)   Negative: Headache and stiff neck (can't touch chin to chest)   Negative: Oxygen level (e.g., pulse oximetry) 90% or lower   Negative: Chest pain or pressure  (Exception: MILD central chest pain, present only when coughing.)   Negative: Drinking very little and dehydration suspected (e.g., no urine > 12 hours, very dry mouth, very lightheaded)   Negative: Patient sounds very sick or weak to the triager   Negative: MILD difficulty breathing (e.g., minimal/no SOB at rest, SOB with walking, pulse <100)   Negative: Fever > 103 F (39.4 C)   Negative: Fever > 101 F (38.3 C) and over 60 years of age   Negative: Fever > 100.0 F (37.8 C) and bedridden (e.g., CVA, chronic illness, recovering from surgery)    Protocols used: Coronavirus (COVID-19) Diagnosed or [id number]

## 2023-10-13 NOTE — TELEPHONE ENCOUNTER
Please give patient information on how to complete virtual COVID visit if he is seeking treatment.

## 2023-10-13 NOTE — TELEPHONE ENCOUNTER
RN COVID TREATMENT VISIT  10/13/23      The patient has been triaged and does not require a higher level of care.    Dejan Zarco  68 year old  Current weight? 199 lbs    Has the patient been seen by a primary care provider at an [facility] or [facility] within the past two years? Yes.   Have you been in close proximity to/do you have a known exposure to a person with a confirmed case of influenza? No.     General treatment eligibility:  Date of positive COVID test (PCR or at home)?  10/12/23    Are you or have you been hospitalized for this COVID-19 infection? No.   Have you received monoclonal antibodies or antiviral treatment for COVID-19 since this positive test? No.   Do you have any of the following conditions that place you at risk of being very sick from COVID-19?   - Age 50 years or older  Yes, patient has at least one high risk condition as noted above.     Current COVID symptoms:   - fever or chills  - cough  - fatigue  - muscle or body aches  - sore throat  - congestion or runny nose  Yes. Patient has at least one symptom as selected.     How many days since symptoms started? 5 days or less. Established patient, 12 years or older weighing at least 88.2 lbs, who has symptoms that started in the past 5 days, has not been hospitalized nor received treatment already, and is at risk for being very sick from COVID-19.     Treatment eligibility by RN:  Are you currently pregnant or nursing? No  Do you have a clinically significant hypersensitivity to nirmatrelvir or ritonavir, or toxic epidermal necrolysis (TEN) or Dennis-Hardy Syndrome? No  Do you have a history of hepatitis, any hepatic impairment on the Problem List (such as Child-Pastrana Class C, cirrhosis, fatty liver disease, alcoholic liver disease), or was the last liver lab (hepatic panel, ALT, AST, ALK Phos, bilirubin) elevated in the past 6 months? No  Do you have any history of severe renal impairment (eGFR <  30mL/min)? No    Is patient eligible to continue? Yes, patient meets all eligibility requirements for the RN COVID treatment (as denoted by all no responses above).     Current Outpatient Medications   Medication Sig Dispense Refill    ascorbic acid, vitamin C, (VITAMIN C) 1000 MG tablet [ASCORBIC ACID, VITAMIN C, (VITAMIN C) 1000 MG TABLET] Take 1,000 mg by mouth as needed.      aspirin 81 MG EC tablet [ASPIRIN 81 MG EC TABLET] Take 81 mg by mouth daily.      atorvastatin (LIPITOR) 20 MG tablet Take 1 tablet (20 mg) by mouth daily 90 tablet 3    cholecalciferol, vitamin D3, (VITAMIN D3) 50 mcg (2,000 unit) Tab [CHOLECALCIFEROL, VITAMIN D3, (VITAMIN D3) 50 MCG (2,000 UNIT) TAB] Take by mouth as needed.      lisinopril (ZESTRIL) 10 MG tablet Take 1 tablet (10 mg) by mouth daily 90 tablet 3    zinc gluconate 30 mg Tab [ZINC GLUCONATE 30 MG TAB] Take by mouth as needed.         Medications from List 1 of the standing order (on medications that exclude the use of Paxlovid) that patient is taking: NONE. Is patient taking Hernandez's Wort? No  Is patient taking Hernandez's Wort or any meds from List 1? No.   Medications from List 2 of the standing order (on meds that provider needs to adjust) that patient is taking: NONE. Is patient on any of the meds from List 2? No.   Medications from List 3 of standing order (on meds that a RN needs to adjust) that patient is taking: atorvastatin (Lipitor): Instructed patient to stop atorvastatin while taking Paxlovid and restart atorvastatin 1 day after the completion of Paxlovid.  Is patient on any meds from List 3? Yes. Patient is on meds from list 3. No meds require a provider visit and at least one med required RN to adjust.     Paxlovid has an approximate 90% reduction in hospitalization. Paxlovid can possibly cause altered sense of taste, diarrhea (loose, watery stools), high blood pressure, muscle aches.     Would patient like a Paxlovid prescription?   Yes.   Lab Results    Component Value Date    GFRESTIMATED >90 02/08/2023       Was last eGFR reduced? No, eGFR 60 or greater/ No Result on record. Patient can receive the normal renal function dose. Paxlovid Rx sent to [facility]   preferred    Temporary change to home medications: see above    All medication adjustments (holds, etc) were discussed with the patient and patient was asked to repeat back (teachback) their med adjustment.  Did patient understand med adjustment? Yes, patient repeated back and understood correctly.        Reviewed the following instructions with the patient:    Paxlovid (nimatrelvir and ritonavir)    How it works  Two medicines (nirmatrelvir and ritonavir) are taken together. They stop the virus from growing. Less amount of virus is easier for your body to fight.    How to take  Medicine comes in a daily container with both medicine tablets. Take by mouth twice daily (once in the morning, once at night) for 5 days.  The number of tablets to take varies by patient.  Don't chew or break capsules. Swallow whole.    When to take  Take as soon as possible after positive COVID-19 test result, and within 5 days of your first symptoms.    Possible side effects  Can cause altered sense of taste, diarrhea (loose, watery stools), high blood pressure, muscle aches.    Clemencia Butcher RN

## 2023-11-07 ENCOUNTER — E-VISIT (OUTPATIENT)
Dept: FAMILY MEDICINE | Facility: CLINIC | Age: 68
End: 2023-11-07
Payer: MEDICARE

## 2023-11-07 DIAGNOSIS — J01.90 ACUTE BACTERIAL SINUSITIS: Primary | ICD-10-CM

## 2023-11-07 DIAGNOSIS — B96.89 ACUTE BACTERIAL SINUSITIS: Primary | ICD-10-CM

## 2023-11-07 PROCEDURE — 99421 OL DIG E/M SVC 5-10 MIN: CPT | Performed by: FAMILY MEDICINE

## 2023-11-08 NOTE — PATIENT INSTRUCTIONS
Suspicious for sinus infection- I will send antibiotics to the pharmacy.  I would suggest taking them with food.  If not improving recommend to be seen.  Dear Dejan Zarco    After reviewing your responses, I've been able to diagnose you with Acute bacterial sinusitis.      Based on your responses and diagnosis, I have prescribed   Orders Placed This Encounter   Medications     amoxicillin-clavulanate (AUGMENTIN) 875-125 MG tablet     Sig: Take 1 tablet by mouth 2 times daily for 7 days     Dispense:  14 tablet     Refill:  0      to treat your symptoms. I have sent this to your pharmacy.?     It is also important to stay well hydrated, get lots of rest and take over-the-counter decongestants,?tylenol?or ibuprofen if you?are able to?take those medications per your primary care provider to help relieve discomfort.?     It is important that you take?all of?your prescribed medication even if your symptoms are improving after a few doses.? Taking?all of?your medicine helps prevent the symptoms from returning.?     If your symptoms worsen, you develop severe headache, vomiting, high fever (>102), or are not improving in 7 days, please contact your primary care provider for an appointment or visit any of our convenient Walk-in Care or Urgent Care Centers to be seen which can be found on our website?here.?     Thanks again for choosing?us?as your health care partner,?   ?  Ned Kwong MD?

## 2023-11-27 ENCOUNTER — IMMUNIZATION (OUTPATIENT)
Dept: FAMILY MEDICINE | Facility: CLINIC | Age: 68
End: 2023-11-27
Payer: MEDICARE

## 2023-11-27 PROCEDURE — 90662 IIV NO PRSV INCREASED AG IM: CPT

## 2023-11-27 PROCEDURE — G0008 ADMIN INFLUENZA VIRUS VAC: HCPCS

## 2024-01-18 ENCOUNTER — IMMUNIZATION (OUTPATIENT)
Dept: FAMILY MEDICINE | Facility: CLINIC | Age: 69
End: 2024-01-18
Payer: MEDICARE

## 2024-01-18 PROCEDURE — 91320 SARSCV2 VAC 30MCG TRS-SUC IM: CPT

## 2024-01-18 PROCEDURE — 90480 ADMN SARSCOV2 VAC 1/ONLY CMP: CPT

## 2024-02-02 DIAGNOSIS — E78.2 MIXED HYPERLIPIDEMIA: ICD-10-CM

## 2024-02-02 DIAGNOSIS — I10 ESSENTIAL HYPERTENSION: ICD-10-CM

## 2024-02-02 RX ORDER — ATORVASTATIN CALCIUM 20 MG/1
20 TABLET, FILM COATED ORAL DAILY
Qty: 30 TABLET | Refills: 0 | Status: SHIPPED | OUTPATIENT
Start: 2024-02-02 | End: 2024-02-14

## 2024-02-02 RX ORDER — LISINOPRIL 10 MG/1
10 TABLET ORAL DAILY
Qty: 30 TABLET | Refills: 0 | Status: SHIPPED | OUTPATIENT
Start: 2024-02-02 | End: 2024-02-14

## 2024-02-13 SDOH — HEALTH STABILITY: PHYSICAL HEALTH: ON AVERAGE, HOW MANY DAYS PER WEEK DO YOU ENGAGE IN MODERATE TO STRENUOUS EXERCISE (LIKE A BRISK WALK)?: 3 DAYS

## 2024-02-13 SDOH — HEALTH STABILITY: PHYSICAL HEALTH: ON AVERAGE, HOW MANY MINUTES DO YOU ENGAGE IN EXERCISE AT THIS LEVEL?: 110 MIN

## 2024-02-13 ASSESSMENT — SOCIAL DETERMINANTS OF HEALTH (SDOH): HOW OFTEN DO YOU GET TOGETHER WITH FRIENDS OR RELATIVES?: PATIENT DECLINED

## 2024-02-14 ENCOUNTER — OFFICE VISIT (OUTPATIENT)
Dept: FAMILY MEDICINE | Facility: CLINIC | Age: 69
End: 2024-02-14
Payer: MEDICARE

## 2024-02-14 VITALS
DIASTOLIC BLOOD PRESSURE: 65 MMHG | SYSTOLIC BLOOD PRESSURE: 119 MMHG | RESPIRATION RATE: 16 BRPM | HEIGHT: 70 IN | TEMPERATURE: 97.6 F | BODY MASS INDEX: 27.92 KG/M2 | OXYGEN SATURATION: 98 % | WEIGHT: 195 LBS | HEART RATE: 64 BPM

## 2024-02-14 DIAGNOSIS — I10 PRIMARY HYPERTENSION: ICD-10-CM

## 2024-02-14 DIAGNOSIS — Z00.00 HEALTH CARE MAINTENANCE: ICD-10-CM

## 2024-02-14 DIAGNOSIS — C43.9 MELANOMA OF SKIN (H): ICD-10-CM

## 2024-02-14 DIAGNOSIS — M62.838 MUSCLE SPASM: ICD-10-CM

## 2024-02-14 DIAGNOSIS — Z12.5 SCREENING FOR PROSTATE CANCER: ICD-10-CM

## 2024-02-14 DIAGNOSIS — E78.2 MIXED HYPERLIPIDEMIA: Primary | ICD-10-CM

## 2024-02-14 LAB
ALBUMIN SERPL BCG-MCNC: 4.4 G/DL (ref 3.5–5.2)
ALP SERPL-CCNC: 70 U/L (ref 40–150)
ALT SERPL W P-5'-P-CCNC: 17 U/L (ref 0–70)
ANION GAP SERPL CALCULATED.3IONS-SCNC: 10 MMOL/L (ref 7–15)
AST SERPL W P-5'-P-CCNC: 18 U/L (ref 0–45)
BILIRUB SERPL-MCNC: 0.8 MG/DL
BUN SERPL-MCNC: 24.7 MG/DL (ref 8–23)
CALCIUM SERPL-MCNC: 9.3 MG/DL (ref 8.8–10.2)
CHLORIDE SERPL-SCNC: 105 MMOL/L (ref 98–107)
CHOLEST SERPL-MCNC: 134 MG/DL
CREAT SERPL-MCNC: 0.95 MG/DL (ref 0.67–1.17)
DEPRECATED HCO3 PLAS-SCNC: 24 MMOL/L (ref 22–29)
EGFRCR SERPLBLD CKD-EPI 2021: 88 ML/MIN/1.73M2
ERYTHROCYTE [DISTWIDTH] IN BLOOD BY AUTOMATED COUNT: 12.5 % (ref 10–15)
FASTING STATUS PATIENT QL REPORTED: YES
GLUCOSE SERPL-MCNC: 98 MG/DL (ref 70–99)
HCT VFR BLD AUTO: 43.1 % (ref 40–53)
HDLC SERPL-MCNC: 45 MG/DL
HGB BLD-MCNC: 14.1 G/DL (ref 13.3–17.7)
LDLC SERPL CALC-MCNC: 77 MG/DL
MCH RBC QN AUTO: 28.9 PG (ref 26.5–33)
MCHC RBC AUTO-ENTMCNC: 32.7 G/DL (ref 31.5–36.5)
MCV RBC AUTO: 88 FL (ref 78–100)
NONHDLC SERPL-MCNC: 89 MG/DL
PLATELET # BLD AUTO: 175 10E3/UL (ref 150–450)
POTASSIUM SERPL-SCNC: 4.3 MMOL/L (ref 3.4–5.3)
PROT SERPL-MCNC: 6.9 G/DL (ref 6.4–8.3)
PSA SERPL DL<=0.01 NG/ML-MCNC: 3.22 NG/ML (ref 0–4.5)
RBC # BLD AUTO: 4.88 10E6/UL (ref 4.4–5.9)
SODIUM SERPL-SCNC: 139 MMOL/L (ref 135–145)
TRIGL SERPL-MCNC: 62 MG/DL
WBC # BLD AUTO: 5.4 10E3/UL (ref 4–11)

## 2024-02-14 PROCEDURE — 36415 COLL VENOUS BLD VENIPUNCTURE: CPT | Performed by: FAMILY MEDICINE

## 2024-02-14 PROCEDURE — 99214 OFFICE O/P EST MOD 30 MIN: CPT | Mod: 25 | Performed by: FAMILY MEDICINE

## 2024-02-14 PROCEDURE — 80061 LIPID PANEL: CPT | Performed by: FAMILY MEDICINE

## 2024-02-14 PROCEDURE — 80053 COMPREHEN METABOLIC PANEL: CPT | Performed by: FAMILY MEDICINE

## 2024-02-14 PROCEDURE — G0103 PSA SCREENING: HCPCS | Performed by: FAMILY MEDICINE

## 2024-02-14 PROCEDURE — G0438 PPPS, INITIAL VISIT: HCPCS | Performed by: FAMILY MEDICINE

## 2024-02-14 PROCEDURE — 85027 COMPLETE CBC AUTOMATED: CPT | Performed by: FAMILY MEDICINE

## 2024-02-14 RX ORDER — CYCLOBENZAPRINE HCL 10 MG
10 TABLET ORAL 3 TIMES DAILY PRN
Qty: 30 TABLET | Refills: 0 | Status: SHIPPED | OUTPATIENT
Start: 2024-02-14

## 2024-02-14 RX ORDER — ATORVASTATIN CALCIUM 20 MG/1
20 TABLET, FILM COATED ORAL DAILY
Qty: 90 TABLET | Refills: 3 | Status: SHIPPED | OUTPATIENT
Start: 2024-02-14

## 2024-02-14 NOTE — PROGRESS NOTES
"Preventive Care Visit  [facility]  Ned Kwogn MD, Family Medicine  Feb 14, 2024    Assessment & Plan     Health care maintenance  Patient fasting they will obtain blood work and follow-up based on results  Up-to-date on colon cancer screening up-to-date on immunizations  - CBC with platelets  - Comprehensive metabolic panel  - Prostate Specific Antigen Screen    Mixed hyperlipidemia  Patient on statin therapy check lipid levels today  - Lipid panel reflex to direct LDL Fasting  - atorvastatin (LIPITOR) 20 MG tablet  Dispense: 90 tablet; Refill: 3    Screening for prostate cancer  Patient like to continue to screen for prostate cancer with PSA  - Prostate Specific Antigen Screen    Melanoma of skin (H)  Patient following with dermatology twice yearly  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    Primary hypertension  Blood pressure is normal in clinic today however has been having low levels at home especially on days when he is exercising discussed trial off lisinopril and monitoring blood pressures and me readings in a couple weeks    Muscle spasm  Patient periodically has some muscle problems and spasms when he injures his back  Like to have cyclobenzaprine on hand for the situations  - cyclobenzaprine (FLEXERIL) 10 MG tablet  Dispense: 30 tablet; Refill: 0            BMI  Estimated body mass index is 27.61 kg/m  as calculated from the following:    Height as of this encounter: 1.79 m (5' 10.47\").    Weight as of this encounter: 88.5 kg (195 lb).       Counseling  Appropriate preventive services were discussed with this patient, including applicable screening as appropriate for fall prevention, nutrition, physical activity, Tobacco-use cessation, weight loss and cognition.  Checklist reviewing preventive services available has been given to the patient.  Reviewed patient's diet, addressing concerns and/or questions.   He is at risk for lack of exercise and has been provided " with information to increase physical activity for the benefit of his well-being.   He is at risk for psychosocial distress and has been provided with information to reduce risk.   Patient reported safety concerns were addressed today.  Patient has been advised of split billing requirements and indicates understanding: Yes        Subjective   Harry is a 68 year old, presenting for the following:  Wellness Visit (Fasting labs )        2/14/2024     8:18 AM   Additional Questions   Roomed by Sosa KING CMA         Health Care Directive  Patient does not have a Health Care Directive or Living Will: Patient states has Advance Directive and will bring in a copy to clinic.    HPI  Patient here for annual exam  No acute concerns  Hide on hypertension medication but has been experiencing some hypotension we discussed trial off the low-dose lisinopril and sending blood pressure readings in a couple weeks.  Patient is exercising on a regular basis typically with skiing and biking.  He is on statin therapy we will check lipid levels today.  He is fasting  Up-to-date on immunizations and colon cancer screening  Like to have a refill of his Flexeril to have on hand for back spasms previous prescription was greater than 4 years ago he uses it quite infrequently.            2/13/2024   General Health   How would you rate your overall physical health? Good   Feel stress (tense, anxious, or unable to sleep) Only a little   (!) STRESS CONCERN      2/13/2024   Nutrition   Diet: Regular (no restrictions)         2/13/2024   Exercise   Days per week of moderate/strenous exercise 3 days   Average minutes spent exercising at this level 110 min         2/13/2024   Social Factors   Frequency of gathering with friends or relatives Patient declined   Worry food won't last until get money to buy more No   Food not last or not have enough money for food? No   Do you have housing?  Yes   Are you worried about losing your housing? No   Lack of  transportation? No   Unable to get utilities (heat,electricity)? No         2/13/2024   Fall Risk   Fallen 2 or more times in the past year? No    No   Trouble with walking or balance? No    No          2/13/2024   Activities of Daily Living- Home Safety   Needs help with the following daily activites None of the above   Safety concerns in the home Throw rugs in the hallway         2/13/2024   Dental   Dentist two times every year? Yes         2/13/2024   Hearing Screening   Hearing concerns? None of the above         2/13/2024   Driving Risk Screening   Patient/family members have concerns about driving No         2/13/2024   General Alertness/Fatigue Screening   Have you been more tired than usual lately? No         2/13/2024   Urinary Incontinence Screening   Bothered by leaking urine in past 6 months No         2/13/2024   TB Screening   Were you born outside of [place]?  No         Today's PHQ-2 Score:       2/13/2024    11:54 AM   PHQ-2 ( 1999 Pfizer)   Q1: Little interest or pleasure in doing things 0   Q2: Feeling down, depressed or hopeless 0   PHQ-2 Score 0   Q1: Little interest or pleasure in doing things Not at all   Q2: Feeling down, depressed or hopeless Not at all   PHQ-2 Score 0           2/13/2024   Substance Use   Alcohol more than 3/day or more than 7/wk No   Do you have a current opioid prescription? No   How severe/bad is pain from 1 to 10? 1/10   Do you use any other substances recreationally? No     Social History     Tobacco Use    Smoking status: Never    Smokeless tobacco: Never   Vaping Use    Vaping Use: Never used   Substance Use Topics    Alcohol use: Yes     Comment: Alcoholic Drinks/day: 1-2/week    Drug use: No           2/13/2024   AAA Screening   Family history of Abdominal Aortic Aneurysm (AAA)? No   Last PSA:   Prostate Specific Antigen Screen   Date Value Ref Range Status   02/08/2023 2.97 0.00 - 4.50 ng/mL Final   11/12/2021 2.78 0.00 - 4.50 ug/L Final     The 10-year ASCVD risk  "score (Rd VENEGAS, et al., 2019) is: 14.9%    Values used to calculate the score:      Age: 68 years      Sex: Male      Is Non- : No      Diabetic: No      Tobacco smoker: No      Systolic Blood Pressure: 119 mmHg      Is BP treated: Yes      HDL Cholesterol: 43 mg/dL      Total Cholesterol: 172 mg/dL            Reviewed and updated as needed this visit by Provider                      Current providers sharing in care for this patient include:  Patient Care Team:  Ned Kwong MD as PCP - General (Family Medicine)  Berna Clark APRN CNP as Nurse Practitioner (Hematology & Oncology)  Tamika Torres MD as MD (Hematology & Oncology)  Doris Priest MD as MD (Hematology & Oncology)  Ned Kwnog MD as Assigned PCP  Anastacio Ortez MD as MD (Dermatology)    The following health maintenance items are reviewed in Epic and correct as of today:  Health Maintenance   Topic Date Due    ANNUAL REVIEW OF HM ORDERS  Never done    HEPATITIS C SCREENING  Never done    RSV VACCINE (Pregnancy & 60+) (1 - 1-dose 60+ series) Never done    DTAP/TDAP/TD IMMUNIZATION (1 - Tdap) 02/14/2019    LIPID  02/08/2024    MEDICARE ANNUAL WELLNESS VISIT  02/08/2024    FALL RISK ASSESSMENT  02/14/2025    GLUCOSE  02/08/2026    COLORECTAL CANCER SCREENING  11/14/2026    ADVANCE CARE PLANNING  02/08/2028    PHQ-2 (once per calendar year)  Completed    INFLUENZA VACCINE  Completed    Pneumococcal Vaccine: 65+ Years  Completed    ZOSTER IMMUNIZATION  Completed    COVID-19 Vaccine  Completed    IPV IMMUNIZATION  Aged Out    HPV IMMUNIZATION  Aged Out    MENINGITIS IMMUNIZATION  Aged Out    RSV MONOCLONAL ANTIBODY  Aged Out            Objective    Exam  /65 (BP Location: Left arm, Patient Position: Sitting, Cuff Size: Adult Large)   Pulse 64   Temp 97.6  F (36.4  C) (Oral)   Resp 16   Ht 1.79 m (5' 10.47\")   Wt 88.5 kg (195 lb)   SpO2 98%   BMI 27.61 kg/m     Estimated body mass index is " "27.61 kg/m  as calculated from the following:    Height as of this encounter: 1.79 m (5' 10.47\").    Weight as of this encounter: 88.5 kg (195 lb).    Physical Exam  GENERAL: alert and no distress  EYES: Eyes grossly normal to inspection, PERRL and conjunctivae and sclerae normal  HENT: ear canals and TM's normal, nose and mouth without ulcers or lesions  RESP: lungs clear to auscultation - no rales, rhonchi or wheezes  CV: regular rate and rhythm, normal S1 S2, no S3 or S4, no murmur, click or rub, no peripheral edema  ABDOMEN: bowel sounds normal  MS: no gross musculoskeletal defects noted, no edema  NEURO: Normal strength and tone, mentation intact and speech normal  PSYCH: mentation appears normal, affect normal/bright         2/14/2024   Mini Cog   Clock Draw Score 2 Normal   3 Item Recall 3 objects recalled   Mini Cog Total Score 5            Signed Electronically by: Ned Kwong MD    "

## 2024-02-14 NOTE — PATIENT INSTRUCTIONS
Hold lisinopril for a few weeks- take blood pressure daily- update Dr. Kwong in 2 weeks on mychart

## 2024-02-21 ENCOUNTER — E-VISIT (OUTPATIENT)
Dept: FAMILY MEDICINE | Facility: CLINIC | Age: 69
End: 2024-02-21
Payer: MEDICARE

## 2024-02-21 ENCOUNTER — TELEPHONE (OUTPATIENT)
Dept: FAMILY MEDICINE | Facility: CLINIC | Age: 69
End: 2024-02-21

## 2024-02-21 DIAGNOSIS — J32.9 SINUSITIS, UNSPECIFIED CHRONICITY, UNSPECIFIED LOCATION: Primary | ICD-10-CM

## 2024-02-21 PROCEDURE — 99421 OL DIG E/M SVC 5-10 MIN: CPT | Mod: 24 | Performed by: FAMILY MEDICINE

## 2024-02-21 NOTE — TELEPHONE ENCOUNTER
Patient requesting abx for sinus infection sx on right side. Writer advised submitting e-visit as well.

## 2024-02-22 NOTE — PATIENT INSTRUCTIONS
Thank you for choosing us for your care. I have placed an order for a prescription so that you can start treatment. View your full visit summary for details by clicking on the link below. Your pharmacist will able to address any questions you may have about the medication.     If you're not feeling better within 5-7 days, please schedule an appointment.  You can schedule an appointment right here in [facility], or call [phone]  If the visit is for the same symptoms as your eVisit, we'll refund the cost of your eVisit if seen within seven days.

## 2024-02-29 ENCOUNTER — OFFICE VISIT (OUTPATIENT)
Dept: FAMILY MEDICINE | Facility: CLINIC | Age: 69
End: 2024-02-29
Payer: MEDICARE

## 2024-02-29 VITALS
SYSTOLIC BLOOD PRESSURE: 151 MMHG | BODY MASS INDEX: 28.97 KG/M2 | HEIGHT: 70 IN | HEART RATE: 70 BPM | OXYGEN SATURATION: 100 % | TEMPERATURE: 98.2 F | WEIGHT: 202.38 LBS | DIASTOLIC BLOOD PRESSURE: 74 MMHG | RESPIRATION RATE: 16 BRPM

## 2024-02-29 DIAGNOSIS — R51.9 FACIAL PAIN: Primary | ICD-10-CM

## 2024-02-29 PROCEDURE — 99213 OFFICE O/P EST LOW 20 MIN: CPT | Performed by: FAMILY MEDICINE

## 2024-02-29 RX ORDER — GABAPENTIN 300 MG/1
300 CAPSULE ORAL DAILY
Qty: 30 CAPSULE | Refills: 0 | Status: SHIPPED | OUTPATIENT
Start: 2024-02-29 | End: 2024-08-26

## 2024-02-29 NOTE — PROGRESS NOTES
Assessment & Plan     1. Facial pain  - gabapentin (NEURONTIN) 300 MG capsule; Take 1 capsule (300 mg) by mouth daily  Dispense: 30 capsule; Refill: 0      Presenting with persistent symptoms of right facial pain in a trigeminal nerve distribution.  He did have an episode of sores on the inside of his mouth which have since healed over.  Denies any other rash on the outside of his face.  I am suspicious for shingles as etiology for current symptoms.  He is outside the treatment window for Valtrex.  Recommend short-term trial of gabapentin to help with nerve pain.  Differential diagnosis includes trigeminal neuralgia.  If symptoms persisting, would consider imaging and trial of carbamazepine.    Start with 300 mg daily gabapentin.  After 3 days if helpful but having breakthrough symptoms later in the day can increase to 300 mg twice daily.      Request that he send his PCP an update on his blood pressure after monitoring for another 1 to 2 weeks.  He may need to go back to antihypertensive treatment.      Cristhian Mcdonald is a 68 year old, presenting for the following health issues:  Sinus Problem      2/29/2024    12:40 PM   Additional Questions   Roomed by Latha DON CMA   Accompanied by Self         2/29/2024    12:46 PM   Patient Reported Additional Medications   Patient reports taking the following new medications Magnesium     Sinus Problem     History of Present Illness       Reason for visit:  Pain or throbbing on the right side of my face  Symptom onset:  1-2 weeks ago  Symptoms include:  Pain or throbbing on the right side of my face from the mouth to my ear. also encompasses my upper and lower jaw. Throbbibg comes in waves.  Symptom intensity:  Moderate  Symptom progression:  Staying the same  Had these symptoms before:  No  What makes it worse:  Food and drink can set it off but it also can happen randomly  What makes it better:  Advil helps some    He eats 2-3 servings of fruits and vegetables  "daily.He consumes 0 sweetened beverage(s) daily.He exercises with enough effort to increase his heart rate 60 or more minutes per day.  He exercises with enough effort to increase his heart rate 3 or less days per week.   He is taking medications regularly.       FACE: Last Tuesday, developed pressure right cheek and forehead. In the fall, had left face symptoms which responded to antibiotic.      For recent symptoms, submitted e-visit to PCP, prescribed Augmentin, Changed, now throbbing pain roof mouth, across cheek bone, to ear. When bad, throbs, feels numb. Feels in lips and down neck as well.      Reports recently at the dentist yesterday, 3D imaging head imaging revealed slightly cloudy sinuses but not as you would expect with sinus infection. Thought possibly infected tooth. Compared to fall imaging, polyps sinuses bilaterally, unchanged.     Hx parotid mass: 2020, right side of face. Biopsy non-hodgkin lymphoma. Saw ENT, removed as much as they could. Biopsy similar. [facility] second opinion. Had subsequent evaluation. Had re-evaluation again, deferred radiation. Followed with oncology, no treatment or follow up needed.     Also following with derm, recent biopsy.    Sunday did have some sores in his mouth. Has healed somewhat,  dentist looked at yesterday and said resolving, could see remnants,     BLOOD PRESSURE: PCP recently discontinued lisinopril due to symptoms - LH/dizzy.   Checking at home, will update Dr. Kwong.     Review of Systems  See HPI above for pertinent ROS.       Objective    BP (!) 151/74 (BP Location: Left arm, Patient Position: Sitting, Cuff Size: Adult Regular)   Pulse 70   Temp 98.2  F (36.8  C) (Oral)   Resp 16   Ht 1.79 m (5' 10.47\")   Wt 91.8 kg (202 lb 6 oz)   SpO2 100%   BMI 28.65 kg/m    Body mass index is 28.65 kg/m .  Physical Exam   GENERAL: Harry is a pleasant, nontoxic male, no acute distress.  FACE: No visible rashes or sores.  No palpable masses in cheek or " submandibular gland region.  Face is symmetric.            Signed Electronically by: Jena Lopez,

## 2024-08-26 ENCOUNTER — OFFICE VISIT (OUTPATIENT)
Dept: FAMILY MEDICINE | Facility: CLINIC | Age: 69
End: 2024-08-26
Payer: MEDICARE

## 2024-08-26 VITALS
TEMPERATURE: 97.9 F | HEIGHT: 70 IN | WEIGHT: 200 LBS | HEART RATE: 75 BPM | SYSTOLIC BLOOD PRESSURE: 118 MMHG | RESPIRATION RATE: 20 BRPM | OXYGEN SATURATION: 97 % | DIASTOLIC BLOOD PRESSURE: 67 MMHG | BODY MASS INDEX: 28.63 KG/M2

## 2024-08-26 DIAGNOSIS — K42.9 UMBILICAL HERNIA WITHOUT OBSTRUCTION AND WITHOUT GANGRENE: Primary | ICD-10-CM

## 2024-08-26 PROCEDURE — 99213 OFFICE O/P EST LOW 20 MIN: CPT | Performed by: FAMILY MEDICINE

## 2024-08-26 RX ORDER — CETIRIZINE HYDROCHLORIDE 10 MG/1
10 TABLET ORAL DAILY
COMMUNITY

## 2024-08-26 NOTE — PROGRESS NOTES
"  Assessment & Plan     Umbilical hernia without obstruction and without gangrene  Here with umbilical hernia  Has had the hernia for many years but now has become hard and difficult to reduce  Discussed referral to general surgery for repair if pain were to return patient should proceed to the emergency room  - Adult Gen Surg  Referral            BMI  Estimated body mass index is 28.32 kg/m  as calculated from the following:    Height as of this encounter: 1.79 m (5' 10.47\").    Weight as of this encounter: 90.7 kg (200 lb).             Cristhian Mcdonald is a 69 year old, presenting for the following health issues:  Hernia (Umbilical hernia, bulging, tender  )        8/26/2024     9:11 AM   Additional Questions   Roomed by Sosa KING CMA     History of Present Illness       Reason for visit:  Naval hernia  Symptom onset:  3-7 days ago  Symptoms include:  Bulge in naval  Symptom intensity:  Mild  Symptom progression:  Staying the same  Had these symptoms before:  Yes  Has tried/received treatment for these symptoms:  No   He is taking medications regularly.       Patient here for evaluation umbilical hernia  Patient is had umbilical hernia for many years but last week started having difficulty with some pain the pain resolved but now is having difficulty with reducing the area with umbilical hernia  He is requesting referral to general surgery which I am in agreement discussed with him if pain were to return or change in bowels to be seen in the emergency room we will place urgent referral to general surgery to get him in soon as possible.              Objective    /67 (BP Location: Left arm, Patient Position: Sitting, Cuff Size: Adult Large)   Pulse 75   Temp 97.9  F (36.6  C) (Oral)   Resp 20   Ht 1.79 m (5' 10.47\")   Wt 90.7 kg (200 lb)   SpO2 97%   BMI 28.32 kg/m    Body mass index is 28.32 kg/m .  Physical Exam   GENERAL: alert and no distress  RESP: lungs clear to auscultation - no rales, " rhonchi or wheezes  CV: regular rate and rhythm, normal S1 S2, no S3 or S4, no murmur, click or rub, no peripheral edema  ABDOMEN: bowel sounds normal and hard and umbilical hernia difficult to reduce on exam will not put the patient through discomfort he is asymptomatic currently but a hard nodule there   MS: no gross musculoskeletal defects noted, no edema  PSYCH: mentation appears normal, affect normal/bright            Signed Electronically by: Ned Kwong MD

## 2024-08-29 ENCOUNTER — OFFICE VISIT (OUTPATIENT)
Dept: SURGERY | Facility: CLINIC | Age: 69
End: 2024-08-29
Payer: MEDICARE

## 2024-08-29 VITALS
DIASTOLIC BLOOD PRESSURE: 68 MMHG | SYSTOLIC BLOOD PRESSURE: 118 MMHG | HEIGHT: 70 IN | BODY MASS INDEX: 28.49 KG/M2 | WEIGHT: 199 LBS

## 2024-08-29 DIAGNOSIS — K42.9 UMBILICAL HERNIA WITHOUT OBSTRUCTION AND WITHOUT GANGRENE: ICD-10-CM

## 2024-08-29 PROCEDURE — 99203 OFFICE O/P NEW LOW 30 MIN: CPT | Performed by: SURGERY

## 2024-08-29 RX ORDER — ACETAMINOPHEN 325 MG/1
975 TABLET ORAL ONCE
Status: CANCELLED | OUTPATIENT
Start: 2024-08-29 | End: 2024-08-29

## 2024-08-29 NOTE — PROGRESS NOTES
HPI:  Dejan Zarco is a 69 year old male who was referred to me by Ned Kwong for evaluation of an umbilical hernia.  He presents with complaints of a bulge at the umbilical region with recent intermittent discomfort.   He has been aware of this for the past several years.  He denies any nausea, vomiting, fevers, chills, bloody bowel movements or any other complaints at this time.  Historically it has always been reducible.  More recently however, he has lost the ability to reduce this.    Allergies:Patient has no known allergies.    Past Medical History:   Diagnosis Date    Lymphoma (H)        Past Surgical History:   Procedure Laterality Date    CYST REMOVAL      Scalp    OTHER SURGICAL HISTORY      scalp surgerylesion removal behind ear and cyst top of head     PAROTIDECTOMY Right 7/28/2020    Procedure: excisional biopsy of right parotid lymphoma, 23 HOUR;  Surgeon: Kota Bradley MD;  Location: [facility];  Service: ENT    US BIOPSY FINE NEEDLE ASPIRATION LYMPH NODE (BREAST) LEFT Left 5/12/2020    US BIOPSY FINE NEEDLE ASPIRATION LYMPH NODE (BREAST) LEFT Left 5/12/2020       CURRENT MEDS:  Current Outpatient Medications   Medication Sig Dispense Refill    ascorbic acid, vitamin C, (VITAMIN C) 1000 MG tablet Take 1,000 mg by mouth as needed.      aspirin 81 MG EC tablet [ASPIRIN 81 MG EC TABLET] Take 81 mg by mouth daily.      atorvastatin (LIPITOR) 20 MG tablet Take 1 tablet (20 mg) by mouth daily 90 tablet 3    cetirizine (ZYRTEC) 10 MG tablet Take 10 mg by mouth daily.      cholecalciferol, vitamin D3, (VITAMIN D3) 50 mcg (2,000 unit) Tab [CHOLECALCIFEROL, VITAMIN D3, (VITAMIN D3) 50 MCG (2,000 UNIT) TAB] Take by mouth as needed.      cyclobenzaprine (FLEXERIL) 10 MG tablet Take 1 tablet (10 mg) by mouth 3 times daily as needed for muscle spasms 30 tablet 0    zinc gluconate 30 mg Tab Take by mouth as needed.         Family History   Problem Relation Age of Onset    Ovarian Cancer Mother  "76.00    Heart Disease Father     Cerebrovascular Disease Father     No Known Problems Sister     Heart Disease Brother     Diabetes Brother     No Known Problems Brother         reports that he has never smoked. He has never been exposed to tobacco smoke. He has never used smokeless tobacco. He reports current alcohol use. He reports that he does not use drugs.    Review of Systems -   The 10 point review of systems  is within normal limits except for as mentioned above in the HPI.  General ROS: No complaints or constitutional symptoms  Skin: No complaints or symptoms   Hematologic/Lymphatic: No symptoms or complaints  Psychiatric: No symptoms or complaints  Endocrine: No excessive fatigue, no hypermetabolic symptoms reported  Respiratory ROS: no cough, shortness of breath, or wheezing  Cardiovascular ROS: no chest pain or dyspnea on exertion  Gastrointestinal ROS: As per HPI  Musculoskeletal ROS: no recent injuries reported  Neurological ROS: no focal neurologic defects reported.        /68   Ht 1.79 m (5' 10.47\")   Wt 90.3 kg (199 lb)   BMI 28.17 kg/m    Body mass index is 28.17 kg/m .    EXAM:  General : Alert, cooperative, appears stated age   Skin: Skin color, texture, turgor normal, no rashes or lesions   Lymphatic: No obvious adenopathy, no swelling   Eyes: No scleral icterus, pupils equal  HENT: no traumatic injury to the head or face, no gross abnormalities  Lungs: Normal respiratory effort, breath sounds equal bilaterally  Heart: Regular rate and rhythm  Abdomen: Nonreducible fat-containing umbilical hernia, fascial defect estimated at 1 cm  Musculoskeletal: No obvious swelling,  Neurologic: Grossly intact      Assessment/Plan:    Dejan Zarco is a 69 year old male with an umbilical hernia.  The pathophysiology of umbilical hernias was discussed as were the surgical and non-operative management strategies.      We discussed both open and laparoscopic approaches, and the respective risks " and benefits of each approach.  We similarly discussed the role and specific risks associated with mesh placement and primary repair.  The risks of surgery in general were discussed which include, but are not limited to, bleeding, infection, recurrent hernia, chronic pain, poor cosmesis, blood clots, stroke, heart attack and death.  Additionally, the risks of observation were discussed which include, but are not limited to, enlargement of the hernia, incarceration, strangulation, pain and death.      He understands everything which was discussed and has consented to proceed with an umbilical hernia.  We will therefore schedule an open umbilical hernia repair at his convenience.      Teodoro Bee MD, FACS  Office: [phone]  [facility]   General and Bariatric Surgery

## 2024-08-29 NOTE — LETTER
8/29/2024      Dejan Zarco  [address] [place] [address]      Dear Colleague,    Thank you for referring your patient, Dejan Zarco, to the [facility] [place]. Please see a copy of my visit note below.    HPI:  Dejan Zarco is a 69 year old male who was referred to me by Ned Kwong for evaluation of an umbilical hernia.  He presents with complaints of a bulge at the umbilical region with recent intermittent discomfort.   He has been aware of this for the past several years.  He denies any nausea, vomiting, fevers, chills, bloody bowel movements or any other complaints at this time.  Historically it has always been reducible.  More recently however, he has lost the ability to reduce this.    Allergies:Patient has no known allergies.    Past Medical History:   Diagnosis Date     Lymphoma (H)        Past Surgical History:   Procedure Laterality Date     CYST REMOVAL      Scalp     OTHER SURGICAL HISTORY      scalp surgerylesion removal behind ear and cyst top of head      PAROTIDECTOMY Right 7/28/2020    Procedure: excisional biopsy of right parotid lymphoma, 23 HOUR;  Surgeon: Kota Bradley MD;  Location: [facility];  Service: ENT     US BIOPSY FINE NEEDLE ASPIRATION LYMPH NODE (BREAST) LEFT Left 5/12/2020     US BIOPSY FINE NEEDLE ASPIRATION LYMPH NODE (BREAST) LEFT Left 5/12/2020       CURRENT MEDS:  Current Outpatient Medications   Medication Sig Dispense Refill     ascorbic acid, vitamin C, (VITAMIN C) 1000 MG tablet Take 1,000 mg by mouth as needed.       aspirin 81 MG EC tablet [ASPIRIN 81 MG EC TABLET] Take 81 mg by mouth daily.       atorvastatin (LIPITOR) 20 MG tablet Take 1 tablet (20 mg) by mouth daily 90 tablet 3     cetirizine (ZYRTEC) 10 MG tablet Take 10 mg by mouth daily.       cholecalciferol, vitamin D3, (VITAMIN D3) 50 mcg (2,000 unit) Tab [CHOLECALCIFEROL, VITAMIN D3, (VITAMIN D3) 50 MCG (2,000 UNIT) TAB]  "Take by mouth as needed.       cyclobenzaprine (FLEXERIL) 10 MG tablet Take 1 tablet (10 mg) by mouth 3 times daily as needed for muscle spasms 30 tablet 0     zinc gluconate 30 mg Tab Take by mouth as needed.         Family History   Problem Relation Age of Onset     Ovarian Cancer Mother 76.00     Heart Disease Father      Cerebrovascular Disease Father      No Known Problems Sister      Heart Disease Brother      Diabetes Brother      No Known Problems Brother         reports that he has never smoked. He has never been exposed to tobacco smoke. He has never used smokeless tobacco. He reports current alcohol use. He reports that he does not use drugs.    Review of Systems -   The 10 point review of systems  is within normal limits except for as mentioned above in the HPI.  General ROS: No complaints or constitutional symptoms  Skin: No complaints or symptoms   Hematologic/Lymphatic: No symptoms or complaints  Psychiatric: No symptoms or complaints  Endocrine: No excessive fatigue, no hypermetabolic symptoms reported  Respiratory ROS: no cough, shortness of breath, or wheezing  Cardiovascular ROS: no chest pain or dyspnea on exertion  Gastrointestinal ROS: As per HPI  Musculoskeletal ROS: no recent injuries reported  Neurological ROS: no focal neurologic defects reported.        /68   Ht 1.79 m (5' 10.47\")   Wt 90.3 kg (199 lb)   BMI 28.17 kg/m    Body mass index is 28.17 kg/m .    EXAM:  General : Alert, cooperative, appears stated age   Skin: Skin color, texture, turgor normal, no rashes or lesions   Lymphatic: No obvious adenopathy, no swelling   Eyes: No scleral icterus, pupils equal  HENT: no traumatic injury to the head or face, no gross abnormalities  Lungs: Normal respiratory effort, breath sounds equal bilaterally  Heart: Regular rate and rhythm  Abdomen: Nonreducible fat-containing umbilical hernia, fascial defect estimated at 1 cm  Musculoskeletal: No obvious swelling,  Neurologic: Grossly " intact      Assessment/Plan:    Dejan Zarco is a 69 year old male with an umbilical hernia.  The pathophysiology of umbilical hernias was discussed as were the surgical and non-operative management strategies.      We discussed both open and laparoscopic approaches, and the respective risks and benefits of each approach.  We similarly discussed the role and specific risks associated with mesh placement and primary repair.  The risks of surgery in general were discussed which include, but are not limited to, bleeding, infection, recurrent hernia, chronic pain, poor cosmesis, blood clots, stroke, heart attack and death.  Additionally, the risks of observation were discussed which include, but are not limited to, enlargement of the hernia, incarceration, strangulation, pain and death.      He understands everything which was discussed and has consented to proceed with an umbilical hernia.  We will therefore schedule an open umbilical hernia repair at his convenience.      Teodoro Bee MD, FACS  Office: [phone]  [facility]   General and Bariatric Surgery      Again, thank you for allowing me to participate in the care of your patient.        Sincerely,        Teodoro Bee MD

## 2024-08-30 ENCOUNTER — TELEPHONE (OUTPATIENT)
Dept: SURGERY | Facility: CLINIC | Age: 69
End: 2024-08-30
Payer: MEDICARE

## 2024-08-30 PROBLEM — K42.9 UMBILICAL HERNIA WITHOUT OBSTRUCTION AND WITHOUT GANGRENE: Status: ACTIVE | Noted: 2024-08-29

## 2024-08-30 NOTE — TELEPHONE ENCOUNTER
Spoke with patient today regarding surgery scheduling      Went over details/instructions.    Surgery Letter sent via [employer]  (Please see LETTERS TAB in chart to retrieve a copy of this letter)

## 2024-08-30 NOTE — LETTER
Pre-op Physical: To be done by Dr Bee day of procedure.    Surgery Date: 9/18/2024     Location: [facility] [address], [place] [id number]    Approximate Arrival Time: 1:00 pm  (Unless instructed differently by the pre-op call nurse)     Post op Appointment: 10/2/2024 at   2:00 pm  with  [facility] post op clinic. [facility], [facility], [place] [id number].    Pre-Surgical Tasks:     Review all medications with your primary care or prescribing physician; they will advise you which meds to stop and when, and when you can resume taking.  Certain medications like blood thinners and weight loss medications need to be stopped in advance of surgery to proceed safely.      Blood thinners including but not exclusive to drugs like Xarelto, Eliquis, Warfarin and Aspirin, should be stopped five days before surgery, if your prescribing provider agrees. Follow your provider's advice on stopping blood thinners because they know you best.  If you are unsure if your medication is a blood thinner, ask your prescribing provider.    Weight loss medications: There are multiple medications being used for weight management and diabetes today, and the list is growing.  Phentermine, Ozempic, Wegovy, Trulicity, and other similar medications need to be stopped one week before surgery to avoid being cancelled.  Victoza and Saxenda can be continued longer but must be stopped one full day before surgery.  Please ask your prescribing provider for advice.    Diabetic medications: in addition to the medications talked about above that are used for either weight loss or diabetes, some people are on insulin that may require adjustment.  Please discuss managing diabetic medications with your prescribing doctor as these medications may require modification prior to surgery.     Please shower the evening before and morning of surgery with Hibiclens soap.   This can be found at your local pharmacy.     Fasting instructions will be provided by the pre-op nurse who will call you 1-3 days before surgery.  Typically, we advise normal food up to 8 hours before you arrive for surgery. Clear liquids only from then until 2 hours before you arrive surgery, then nothing at all by mouth.  The nurse will review your specific instructions with you at the call.      Smoking impacts your body's ability to heal properly so we advise patients to quit if possible before surgery.  Plastic Surgery patients are required to be nicotine free for at least 8 weeks before surgery.      You will need an adult to drive you home and stay with you 24 hours after surgery. Public transportation or Medical Van Services are not permitted.    Visitor restrictions are subject to change, please verify with the pre-op nurse when they call how many people are permitted to accompany you.    We always encourage you to notify your insurance any time you have medical tests or procedures scheduled including surgery. The number is usually right on the back of your insurance card. To obtain pricing for surgery, please call [facility] Cost of Care at [phone] or email [email or web address].        Call our office if you have any questions! Thank you!     Carlos Guillen  Complex   [facility]  [phone]

## 2024-09-17 ENCOUNTER — ANESTHESIA EVENT (OUTPATIENT)
Dept: SURGERY | Facility: AMBULATORY SURGERY CENTER | Age: 69
End: 2024-09-17
Payer: MEDICARE

## 2024-09-18 ENCOUNTER — HOSPITAL ENCOUNTER (OUTPATIENT)
Facility: AMBULATORY SURGERY CENTER | Age: 69
Discharge: HOME | End: 2024-09-18
Attending: SURGERY
Payer: MEDICARE

## 2024-09-18 ENCOUNTER — ANESTHESIA (OUTPATIENT)
Dept: SURGERY | Facility: AMBULATORY SURGERY CENTER | Age: 69
End: 2024-09-18
Payer: MEDICARE

## 2024-09-18 VITALS
BODY MASS INDEX: 27.86 KG/M2 | RESPIRATION RATE: 16 BRPM | HEIGHT: 71 IN | SYSTOLIC BLOOD PRESSURE: 108 MMHG | OXYGEN SATURATION: 98 % | DIASTOLIC BLOOD PRESSURE: 62 MMHG | TEMPERATURE: 96.9 F | HEART RATE: 60 BPM | WEIGHT: 199 LBS

## 2024-09-18 DIAGNOSIS — K42.9 UMBILICAL HERNIA WITHOUT OBSTRUCTION AND WITHOUT GANGRENE: ICD-10-CM

## 2024-09-18 PROCEDURE — 49591 RPR AA HRN 1ST < 3 CM RDC: CPT | Performed by: SURGERY

## 2024-09-18 RX ORDER — SODIUM CHLORIDE, SODIUM LACTATE, POTASSIUM CHLORIDE, CALCIUM CHLORIDE 600; 310; 30; 20 MG/100ML; MG/100ML; MG/100ML; MG/100ML
INJECTION, SOLUTION INTRAVENOUS CONTINUOUS
Status: DISCONTINUED | OUTPATIENT
Start: 2024-09-18 | End: 2024-09-19 | Stop reason: HOSPADM

## 2024-09-18 RX ORDER — ACETAMINOPHEN 325 MG/1
975 TABLET ORAL ONCE
Status: DISCONTINUED | OUTPATIENT
Start: 2024-09-18 | End: 2024-09-19 | Stop reason: HOSPADM

## 2024-09-18 RX ORDER — ONDANSETRON 2 MG/ML
4 INJECTION INTRAMUSCULAR; INTRAVENOUS EVERY 30 MIN PRN
Status: DISCONTINUED | OUTPATIENT
Start: 2024-09-18 | End: 2024-09-19 | Stop reason: HOSPADM

## 2024-09-18 RX ORDER — TRAMADOL HYDROCHLORIDE 50 MG/1
50 TABLET ORAL EVERY 6 HOURS PRN
Qty: 10 TABLET | Refills: 0 | Status: SHIPPED | OUTPATIENT
Start: 2024-09-18 | End: 2024-09-21

## 2024-09-18 RX ORDER — HYDROMORPHONE HCL IN WATER/PF 6 MG/30 ML
0.2 PATIENT CONTROLLED ANALGESIA SYRINGE INTRAVENOUS EVERY 5 MIN PRN
Status: DISCONTINUED | OUTPATIENT
Start: 2024-09-18 | End: 2024-09-19 | Stop reason: HOSPADM

## 2024-09-18 RX ORDER — ONDANSETRON 2 MG/ML
INJECTION INTRAMUSCULAR; INTRAVENOUS PRN
Status: DISCONTINUED | OUTPATIENT
Start: 2024-09-18 | End: 2024-09-18

## 2024-09-18 RX ORDER — DEXAMETHASONE SODIUM PHOSPHATE 4 MG/ML
4 INJECTION, SOLUTION INTRA-ARTICULAR; INTRALESIONAL; INTRAMUSCULAR; INTRAVENOUS; SOFT TISSUE
Status: DISCONTINUED | OUTPATIENT
Start: 2024-09-18 | End: 2024-09-19 | Stop reason: HOSPADM

## 2024-09-18 RX ORDER — FENTANYL CITRATE 0.05 MG/ML
50 INJECTION, SOLUTION INTRAMUSCULAR; INTRAVENOUS EVERY 5 MIN PRN
Status: DISCONTINUED | OUTPATIENT
Start: 2024-09-18 | End: 2024-09-19 | Stop reason: HOSPADM

## 2024-09-18 RX ORDER — ACETAMINOPHEN 325 MG/1
975 TABLET ORAL ONCE
Status: COMPLETED | OUTPATIENT
Start: 2024-09-18 | End: 2024-09-18

## 2024-09-18 RX ORDER — FENTANYL CITRATE 0.05 MG/ML
25 INJECTION, SOLUTION INTRAMUSCULAR; INTRAVENOUS EVERY 5 MIN PRN
Status: DISCONTINUED | OUTPATIENT
Start: 2024-09-18 | End: 2024-09-19 | Stop reason: HOSPADM

## 2024-09-18 RX ORDER — LIDOCAINE 40 MG/G
CREAM TOPICAL
Status: DISCONTINUED | OUTPATIENT
Start: 2024-09-18 | End: 2024-09-19 | Stop reason: HOSPADM

## 2024-09-18 RX ORDER — LIDOCAINE HYDROCHLORIDE 20 MG/ML
INJECTION, SOLUTION INFILTRATION; PERINEURAL PRN
Status: DISCONTINUED | OUTPATIENT
Start: 2024-09-18 | End: 2024-09-18

## 2024-09-18 RX ORDER — GLYCOPYRROLATE 0.2 MG/ML
INJECTION, SOLUTION INTRAMUSCULAR; INTRAVENOUS PRN
Status: DISCONTINUED | OUTPATIENT
Start: 2024-09-18 | End: 2024-09-18

## 2024-09-18 RX ORDER — FENTANYL CITRATE 50 UG/ML
INJECTION, SOLUTION INTRAMUSCULAR; INTRAVENOUS PRN
Status: DISCONTINUED | OUTPATIENT
Start: 2024-09-18 | End: 2024-09-18

## 2024-09-18 RX ORDER — OXYCODONE HYDROCHLORIDE 10 MG/1
10 TABLET ORAL
Status: DISCONTINUED | OUTPATIENT
Start: 2024-09-18 | End: 2024-09-19 | Stop reason: HOSPADM

## 2024-09-18 RX ORDER — HYDROMORPHONE HCL IN WATER/PF 6 MG/30 ML
0.4 PATIENT CONTROLLED ANALGESIA SYRINGE INTRAVENOUS EVERY 5 MIN PRN
Status: DISCONTINUED | OUTPATIENT
Start: 2024-09-18 | End: 2024-09-19 | Stop reason: HOSPADM

## 2024-09-18 RX ORDER — MEPERIDINE HYDROCHLORIDE 25 MG/ML
12.5 INJECTION INTRAMUSCULAR; INTRAVENOUS; SUBCUTANEOUS EVERY 5 MIN PRN
Status: DISCONTINUED | OUTPATIENT
Start: 2024-09-18 | End: 2024-09-19 | Stop reason: HOSPADM

## 2024-09-18 RX ORDER — CEFAZOLIN SODIUM 2 G/100ML
2 INJECTION, SOLUTION INTRAVENOUS SEE ADMIN INSTRUCTIONS
Status: DISCONTINUED | OUTPATIENT
Start: 2024-09-18 | End: 2024-09-19 | Stop reason: HOSPADM

## 2024-09-18 RX ORDER — ONDANSETRON 4 MG/1
4 TABLET, ORALLY DISINTEGRATING ORAL EVERY 30 MIN PRN
Status: DISCONTINUED | OUTPATIENT
Start: 2024-09-18 | End: 2024-09-19 | Stop reason: HOSPADM

## 2024-09-18 RX ORDER — NALOXONE HYDROCHLORIDE 0.4 MG/ML
0.1 INJECTION, SOLUTION INTRAMUSCULAR; INTRAVENOUS; SUBCUTANEOUS
Status: DISCONTINUED | OUTPATIENT
Start: 2024-09-18 | End: 2024-09-19 | Stop reason: HOSPADM

## 2024-09-18 RX ORDER — OXYCODONE HYDROCHLORIDE 5 MG/1
5 TABLET ORAL
Status: DISCONTINUED | OUTPATIENT
Start: 2024-09-18 | End: 2024-09-19 | Stop reason: HOSPADM

## 2024-09-18 RX ORDER — CEFAZOLIN SODIUM 2 G/100ML
2 INJECTION, SOLUTION INTRAVENOUS
Status: COMPLETED | OUTPATIENT
Start: 2024-09-18 | End: 2024-09-18

## 2024-09-18 RX ORDER — KETOROLAC TROMETHAMINE 15 MG/ML
15 INJECTION, SOLUTION INTRAMUSCULAR; INTRAVENOUS
Status: DISCONTINUED | OUTPATIENT
Start: 2024-09-18 | End: 2024-09-19 | Stop reason: HOSPADM

## 2024-09-18 RX ORDER — KETOROLAC TROMETHAMINE 30 MG/ML
INJECTION, SOLUTION INTRAMUSCULAR; INTRAVENOUS PRN
Status: DISCONTINUED | OUTPATIENT
Start: 2024-09-18 | End: 2024-09-18

## 2024-09-18 RX ORDER — PROPOFOL 10 MG/ML
INJECTION, EMULSION INTRAVENOUS CONTINUOUS PRN
Status: DISCONTINUED | OUTPATIENT
Start: 2024-09-18 | End: 2024-09-18

## 2024-09-18 RX ORDER — PROPOFOL 10 MG/ML
INJECTION, EMULSION INTRAVENOUS PRN
Status: DISCONTINUED | OUTPATIENT
Start: 2024-09-18 | End: 2024-09-18

## 2024-09-18 RX ADMIN — ONDANSETRON 4 MG: 2 INJECTION INTRAMUSCULAR; INTRAVENOUS at 13:26

## 2024-09-18 RX ADMIN — FENTANYL CITRATE 25 MCG: 50 INJECTION, SOLUTION INTRAMUSCULAR; INTRAVENOUS at 13:31

## 2024-09-18 RX ADMIN — KETOROLAC TROMETHAMINE 15 MG: 30 INJECTION, SOLUTION INTRAMUSCULAR; INTRAVENOUS at 13:56

## 2024-09-18 RX ADMIN — PROPOFOL 20 MG: 10 INJECTION, EMULSION INTRAVENOUS at 13:25

## 2024-09-18 RX ADMIN — PROPOFOL 10 MG: 10 INJECTION, EMULSION INTRAVENOUS at 13:26

## 2024-09-18 RX ADMIN — SODIUM CHLORIDE, SODIUM LACTATE, POTASSIUM CHLORIDE, CALCIUM CHLORIDE: 600; 310; 30; 20 INJECTION, SOLUTION INTRAVENOUS at 12:45

## 2024-09-18 RX ADMIN — GLYCOPYRROLATE 0.1 MG: 0.2 INJECTION, SOLUTION INTRAMUSCULAR; INTRAVENOUS at 13:35

## 2024-09-18 RX ADMIN — FENTANYL CITRATE 25 MCG: 50 INJECTION, SOLUTION INTRAMUSCULAR; INTRAVENOUS at 13:29

## 2024-09-18 RX ADMIN — LIDOCAINE HYDROCHLORIDE 3 ML: 20 INJECTION, SOLUTION INFILTRATION; PERINEURAL at 13:25

## 2024-09-18 RX ADMIN — CEFAZOLIN SODIUM 2 G: 2 INJECTION, SOLUTION INTRAVENOUS at 13:20

## 2024-09-18 RX ADMIN — GLYCOPYRROLATE 0.1 MG: 0.2 INJECTION, SOLUTION INTRAMUSCULAR; INTRAVENOUS at 13:33

## 2024-09-18 RX ADMIN — PROPOFOL 200 MCG/KG/MIN: 10 INJECTION, EMULSION INTRAVENOUS at 13:25

## 2024-09-18 RX ADMIN — ACETAMINOPHEN 975 MG: 325 TABLET ORAL at 12:46

## 2024-09-18 NOTE — ANESTHESIA PREPROCEDURE EVALUATION
Anesthesia Pre-Procedure Evaluation    Patient: Dejan Zarco   MRN: [id number] : 1955        Procedure : Procedure(s):  HERNIORRHAPHY, UMBILICAL, OPEN          Past Medical History:   Diagnosis Date    Lymphoma (H)       Past Surgical History:   Procedure Laterality Date    CYST REMOVAL      Scalp    OTHER SURGICAL HISTORY      scalp surgerylesion removal behind ear and cyst top of head     PAROTIDECTOMY Right 2020    Procedure: excisional biopsy of right parotid lymphoma, 23 HOUR;  Surgeon: Kota Bradley MD;  Location: [facility];  Service: ENT    US BIOPSY FINE NEEDLE ASPIRATION LYMPH NODE (BREAST) LEFT Left 2020    US BIOPSY FINE NEEDLE ASPIRATION LYMPH NODE (BREAST) LEFT Left 2020      No Known Allergies   Social History     Tobacco Use    Smoking status: Never     Passive exposure: Never    Smokeless tobacco: Never   Substance Use Topics    Alcohol use: Yes     Comment: minimal      Wt Readings from Last 1 Encounters:   24 90.3 kg (199 lb)        Anesthesia Evaluation            ROS/MED HX  ENT/Pulmonary:  - neg pulmonary ROS     Neurologic:  - neg neurologic ROS     Cardiovascular:  - neg cardiovascular ROS     METS/Exercise Tolerance: >4 METS    Hematologic:  - neg hematologic  ROS     Musculoskeletal:  - neg musculoskeletal ROS     GI/Hepatic:  - neg GI/hepatic ROS     Renal/Genitourinary:  - neg Renal ROS     Endo:  - neg endo ROS     Psychiatric/Substance Use:  - neg psychiatric ROS     Infectious Disease:  - neg infectious disease ROS     Malignancy:  - neg malignancy ROS     Other:            Physical Exam    Airway  airway exam normal      Mallampati: I       Respiratory Devices and Support         Dental       (+) Minor Abnormalities - some fillings, tiny chips      Cardiovascular   cardiovascular exam normal          Pulmonary   pulmonary exam normal                OUTSIDE LABS:  CBC:   Lab Results   Component Value Date    WBC 5.4 2024    WBC 5.1  "02/08/2023    HGB 14.1 02/14/2024    HGB 13.5 02/08/2023    HCT 43.1 02/14/2024    HCT 41.5 02/08/2023     02/14/2024     02/08/2023     BMP:   Lab Results   Component Value Date     02/14/2024     02/08/2023    POTASSIUM 4.3 02/14/2024    POTASSIUM 4.8 02/08/2023    CHLORIDE 105 02/14/2024    CHLORIDE 106 02/08/2023    CO2 24 02/14/2024    CO2 27 02/08/2023    BUN 24.7 (H) 02/14/2024    BUN 19.5 02/08/2023    CR 0.95 02/14/2024    CR 0.81 02/08/2023    GLC 98 02/14/2024     (H) 02/08/2023     COAGS: No results found for: \"PTT\", \"INR\", \"FIBR\"  POC: No results found for: \"BGM\", \"HCG\", \"HCGS\"  HEPATIC:   Lab Results   Component Value Date    ALBUMIN 4.4 02/14/2024    PROTTOTAL 6.9 02/14/2024    ALT 17 02/14/2024    AST 18 02/14/2024    ALKPHOS 70 02/14/2024    BILITOTAL 0.8 02/14/2024     OTHER:   Lab Results   Component Value Date    ANNIKA 9.3 02/14/2024    PHOS 3.1 05/12/2020    TSH 0.82 08/27/2021       Anesthesia Plan    ASA Status:  1    NPO Status:  NPO Appropriate    Anesthesia Type: MAC.   Induction: Intravenous, Propofol.   Maintenance: TIVA.        Consents    Anesthesia Plan(s) and associated risks, benefits, and realistic alternatives discussed. Questions answered and patient/representative(s) expressed understanding.     - Discussed:     - Discussed with:  Patient            Postoperative Care       PONV prophylaxis: Ondansetron (or other 5HT-3), Background Propofol Infusion     Comments:               Jose Mendez MD    I have reviewed the pertinent notes and labs in the chart from the past 30 days and (re)examined the patient.  Any updates or changes from those notes are reflected in this note.             # Drug Induced Platelet Defect: home medication list includes an antiplatelet medication  # Overweight: Estimated body mass index is 28.15 kg/m  as calculated from the following:    Height as of this encounter: 1.791 m (5' 10.5\").    Weight as of this encounter: " 90.3 kg (199 lb).

## 2024-09-18 NOTE — DISCHARGE INSTRUCTIONS
If you have any questions or concerns regarding your procedure, please contact Dr. Bee, his office number is [phone].        You have received 975 mg of Acetaminophen (Tylenol) at 12:45pm. Please do not take an additional dose of Tylenol until after 6:45pm.     Do not exceed 4,000 mg of acetaminophen during a 24 hour period and keep in mind that acetaminophen can also be found in many over-the-counter cold medications as well as narcotics that may be given for pain.      You received a dose of IV Toradol at 2:00pm. Please do not take any additional Ibuprofen/NSAID products (Aleve/Advil/Motrin/Naproxen/Celebrex) until after 8:00pm.

## 2024-09-18 NOTE — ANESTHESIA CARE TRANSFER NOTE
Patient: Dejan Zarco    Procedure: Procedure(s):  HERNIORRHAPHY, UMBILICAL, OPEN       Diagnosis: Umbilical hernia without obstruction and without gangrene [K42.9]  Diagnosis Additional Information: No value filed.    Anesthesia Type:   MAC     Note:    Oropharynx: oropharynx clear of all foreign objects and spontaneously breathing  Level of Consciousness: drowsy  Oxygen Supplementation: face mask  Level of Supplemental Oxygen (L/min / FiO2): 8  Independent Airway: airway patency satisfactory and stable  Dentition: dentition unchanged  Vital Signs Stable: post-procedure vital signs reviewed and stable  Report to RN Given: handoff report given  Patient transferred to: Phase II    Handoff Report: Identifed the Patient, Identified the Reponsible Provider, Reviewed the pertinent medical history, Discussed the surgical course, Reviewed Intra-OP anesthesia mangement and issues during anesthesia, Set expectations for post-procedure period and Allowed opportunity for questions and acknowledgement of understanding      Vitals:  Vitals Value Taken Time   /55 09/18/24 1353   Temp 96.9  F (36.1  C) 09/18/24 1353   Pulse 68 09/18/24 1354   Resp 18 09/18/24 1353   SpO2 98 % 09/18/24 1354   Vitals shown include unfiled device data.    Electronically Signed By: ANKITA Carpio CRNA  September 18, 2024  1:57 PM

## 2024-09-18 NOTE — PROGRESS NOTES
Pt unable to urinate prior to leaving facility. Cleared by Dr. Bee to be discharged. Pt and spouse told that he will need to go to ER if unable to urinate 8 hrs after procedure. Pt and spouse verbalized understanding.     Jasmin Castro RN on 9/18/2024 at 3:47 PM

## 2024-09-18 NOTE — INTERVAL H&P NOTE
"I have reviewed the surgical (or preoperative) H&P that is linked to this encounter, and examined the patient. There are no significant changes    Teodoro Bee MD, FACS  Office: [phone]  [facility]   General and Bariatric Surgery      Clinical Conditions Present on Arrival:  Clinically Significant Risk Factors Present on Admission                 # Drug Induced Platelet Defect: home medication list includes an antiplatelet medication      # Overweight: Estimated body mass index is 28.15 kg/m  as calculated from the following:    Height as of this encounter: 1.791 m (5' 10.5\").    Weight as of this encounter: 90.3 kg (199 lb).       "

## 2024-09-18 NOTE — ANESTHESIA POSTPROCEDURE EVALUATION
Patient: Dejan Zarco    Procedure: Procedure(s):  HERNIORRHAPHY, UMBILICAL, OPEN       Anesthesia Type:  MAC    Note:     Postop Pain Control: Uneventful            Sign Out: Well controlled pain   PONV: No   Neuro/Psych: Uneventful            Sign Out: Acceptable/Baseline neuro status   Airway/Respiratory: Uneventful            Sign Out: Acceptable/Baseline resp. status   CV/Hemodynamics: Uneventful            Sign Out: Acceptable CV status; No obvious hypovolemia; No obvious fluid overload   Other NRE: NONE   DID A NON-ROUTINE EVENT OCCUR? No           Last vitals:  Vitals Value Taken Time   /61 09/18/24 1445   Temp 96.9  F (36.1  C) 09/18/24 1353   Pulse 59 09/18/24 1453   Resp 18 09/18/24 1353   SpO2 94 % 09/18/24 1453   Vitals shown include unfiled device data.    Electronically Signed By: Jose Mendez MD  September 18, 2024  2:56 PM

## 2024-09-18 NOTE — OP NOTE
[facility]  Operative Note    Pre-operative diagnosis: Umbilical hernia without obstruction and without gangrene [K42.9]   Post-operative diagnosis Umbilical hernia   Procedure: Procedure(s):  HERNIORRHAPHY, UMBILICAL, OPEN   Surgeon: Teodoro Bee MD   Assistants(s): none   Anesthesia: Monitored anesthesia care    Estimated blood loss: 10 mL                Drains: {None   Specimens: None       Findings: 1 cm hernia.   Complications: None.           Description of procedure:    The patient was brought to the operating room where after induction of monitored anesthetic care he was positioned with both arms out. HE was then prepped and draped in sterile fashion.  After procedural timeout, we began by injecting local anesthetic into the skin and soft tissue surrounding the umbilical hernia.  We then proceeded to make a curvilinear infraumbilical incision just below the umbilicus.  This dissection was then carried down to the underlying fascia where we then dissected circumferentially around the neck of the hernia at the level of the abdominal wall fascia.  Once this was circumferentially dissected, the umbilical hernia neck was divided electrocautery at the level of the fascia.  The fascial defect was roughly 1 cm cm in size.  The protruding preperitoneal fatty tissues were reduced back into the abdomen.  No mesh was used given the small size.  The fascia was closed with interrupted figure of 8 0 ethibond sutures..  The umbilical stalk was then sutured down to the underlying fascia with a 2-0 Vicryl suture.  The skin was then closed with a running 4-0 Vicryl subcuticular stitch.       Teodoro Bee MD, [facility]  Office: [phone]  [facility]   General and Bariatric Surgery

## 2024-09-23 ENCOUNTER — TELEPHONE (OUTPATIENT)
Dept: SURGERY | Facility: CLINIC | Age: 69
End: 2024-09-23
Payer: MEDICARE

## 2024-09-23 NOTE — TELEPHONE ENCOUNTER
[facility] Post-Op Phone Call                                                                                                    Surgeon: Teodoro Bee MD     Surgery: Open Umbilical Herniorrphaphy  Date of Surgery: 9/18/2024  Discharge Date: 9/18/2024     Patient outreach on:   Date: 9/23/2024          Time: 2:40 pm    Patient has post-op with LILLIE on 10/2/24.    RN called patient to complete post-op call. No answer and left message for patient to call clinic for any questions or concerns regarding recovery.    Venus CABALLERO RN, BSN

## 2024-10-02 ENCOUNTER — OFFICE VISIT (OUTPATIENT)
Dept: SURGERY | Facility: CLINIC | Age: 69
End: 2024-10-02
Payer: MEDICARE

## 2024-10-02 DIAGNOSIS — Z48.89 POSTOPERATIVE VISIT: Primary | ICD-10-CM

## 2024-10-02 PROCEDURE — 99212 OFFICE O/P EST SF 10 MIN: CPT

## 2024-10-02 NOTE — PROGRESS NOTES
HPI: Patient is here for follow-up of a open umbilical hernia repair with Dr. Bee on 9/18/2024 for a 1 cm umbilical hernia.  No mesh was placed at that time. He is doing well. Pain is well controlled. No difficulties with the surgical wound(s). He is eating well and denies fever or chills.   Is back to his normal activities and has biked quite a few miles.  Is doing well and has not had any difficulties postoperatively.      There were no vitals taken for this visit.    EXAM:  General: Appears well, sitting upright in chair and conversant  Abdomen: Soft, non-tender, non-distended. No rebound or guarding.  Surgical wounds: Incisions healing well, clean/dry/intact without induration or drainage.      Assessment/Plan: Doing well after surgery, no immediate concerns and should follow up as needed.    FABIENNE Plascencia [facility] General Surgery  [facility], [address]  [place] [address]  Office [phone]

## 2024-10-23 ENCOUNTER — IMMUNIZATION (OUTPATIENT)
Dept: FAMILY MEDICINE | Facility: CLINIC | Age: 69
End: 2024-10-23
Payer: MEDICARE

## 2024-10-23 VITALS — TEMPERATURE: 98.1 F

## 2024-10-23 PROCEDURE — G0008 ADMIN INFLUENZA VIRUS VAC: HCPCS

## 2024-10-23 PROCEDURE — 91320 SARSCV2 VAC 30MCG TRS-SUC IM: CPT

## 2024-10-23 PROCEDURE — 90480 ADMN SARSCOV2 VAC 1/ONLY CMP: CPT

## 2024-10-23 PROCEDURE — 90662 IIV NO PRSV INCREASED AG IM: CPT

## 2025-01-14 ENCOUNTER — TRANSFERRED RECORDS (OUTPATIENT)
Dept: HEALTH INFORMATION MANAGEMENT | Facility: CLINIC | Age: 70
End: 2025-01-14
Payer: MEDICARE

## 2025-01-15 ENCOUNTER — PATIENT OUTREACH (OUTPATIENT)
Dept: CARE COORDINATION | Facility: CLINIC | Age: 70
End: 2025-01-15
Payer: MEDICARE

## 2025-02-25 SDOH — HEALTH STABILITY: PHYSICAL HEALTH: ON AVERAGE, HOW MANY DAYS PER WEEK DO YOU ENGAGE IN MODERATE TO STRENUOUS EXERCISE (LIKE A BRISK WALK)?: 3 DAYS

## 2025-02-25 SDOH — HEALTH STABILITY: PHYSICAL HEALTH: ON AVERAGE, HOW MANY MINUTES DO YOU ENGAGE IN EXERCISE AT THIS LEVEL?: 120 MIN

## 2025-02-25 ASSESSMENT — SOCIAL DETERMINANTS OF HEALTH (SDOH): HOW OFTEN DO YOU GET TOGETHER WITH FRIENDS OR RELATIVES?: PATIENT DECLINED

## 2025-02-26 ENCOUNTER — OFFICE VISIT (OUTPATIENT)
Dept: FAMILY MEDICINE | Facility: CLINIC | Age: 70
End: 2025-02-26
Payer: MEDICARE

## 2025-02-26 VITALS
SYSTOLIC BLOOD PRESSURE: 134 MMHG | RESPIRATION RATE: 16 BRPM | WEIGHT: 199.4 LBS | OXYGEN SATURATION: 97 % | HEIGHT: 71 IN | DIASTOLIC BLOOD PRESSURE: 67 MMHG | HEART RATE: 67 BPM | BODY MASS INDEX: 27.92 KG/M2

## 2025-02-26 DIAGNOSIS — C43.9 MELANOMA OF SKIN (H): ICD-10-CM

## 2025-02-26 DIAGNOSIS — Z12.5 SCREENING FOR PROSTATE CANCER: ICD-10-CM

## 2025-02-26 DIAGNOSIS — M62.838 MUSCLE SPASM: ICD-10-CM

## 2025-02-26 DIAGNOSIS — E78.2 MIXED HYPERLIPIDEMIA: Primary | ICD-10-CM

## 2025-02-26 DIAGNOSIS — Z00.00 HEALTH CARE MAINTENANCE: ICD-10-CM

## 2025-02-26 RX ORDER — ATORVASTATIN CALCIUM 20 MG/1
20 TABLET, FILM COATED ORAL DAILY
Qty: 90 TABLET | Refills: 3 | Status: SHIPPED | OUTPATIENT
Start: 2025-02-26

## 2025-02-26 RX ORDER — CYCLOBENZAPRINE HCL 10 MG
10 TABLET ORAL 3 TIMES DAILY PRN
Qty: 30 TABLET | Refills: 0 | Status: SHIPPED | OUTPATIENT
Start: 2025-02-26

## 2025-02-26 NOTE — PROGRESS NOTES
"Preventive Care Visit  [facility]  Ned Kwong MD, Family Medicine  Feb 26, 2025      Assessment & Plan     Health care maintenance  Patient here for evaluation of annual exam  Not fasting they will return at a future date for fasting blood work up-to-date on colon cancer screening up-to-date on immunizations  - Lipid panel reflex to direct LDL Non-fasting  - atorvastatin (LIPITOR) 20 MG tablet  Dispense: 90 tablet; Refill: 3  - PSA, screen  - CBC with platelets  - Comprehensive metabolic panel (BMP + Alb, Alk Phos, ALT, AST, Total. Bili, TP)    Mixed hyperlipidemia  Patient on statin therapy tolerating well check lipid levels  - Lipid panel reflex to direct LDL Non-fasting  - atorvastatin (LIPITOR) 20 MG tablet  Dispense: 90 tablet; Refill: 3    Screening for prostate cancer  Patient would continue screen for prostate cancer with PSA testing  - PSA, screen    Melanoma of skin (H)  Patient following with dermatology bi-annually  History of melanoma no recent cancerous lesions removed    Muscle spasm  Patient requesting refill of cyclobenzaprine to have on hand for back issues and frequently uses the medication  - cyclobenzaprine (FLEXERIL) 10 MG tablet  Dispense: 30 tablet; Refill: 0      Patient has been advised of split billing requirements and indicates understanding: Yes        BMI  Estimated body mass index is 28.21 kg/m  as calculated from the following:    Height as of this encounter: 1.791 m (5' 10.5\").    Weight as of this encounter: 90.4 kg (199 lb 6.4 oz).   Weight management plan: Discussed healthy diet and exercise guidelines    Counseling  Appropriate preventive services were addressed with this patient via screening, questionnaire, or discussion as appropriate for fall prevention, nutrition, physical activity, Tobacco-use cessation, social engagement, weight loss and cognition.  Checklist reviewing preventive services available has been given to the " patient.  Reviewed patient's diet, addressing concerns and/or questions.   He is at risk for lack of exercise and has been provided with information to increase physical activity for the benefit of his well-being.   Patient reported safety concerns were addressed today.        Cristhian Mcdonald is a 69 year old, presenting for the following:  Wellness Visit (Pt is NOT fasting today, no concerns. )        2/26/2025    11:59 AM   Additional Questions   Roomed by Chapis Aguilar CMA           [facility]  For annual exam  Up-to-date on immunizations up-to-date on colon cancer screening  Patient returns here today for fasting blood work and like to continue screening for prostate cancer with PSA testing  Following with dermatology twice a year for history of melanoma skin no recent lesions have been cancerous.  Using cyclobenzaprine very infrequently last prescription was a year ago he like to have a refill on hand for back issues.  Patient is exercising on a regular basis with biking when the weather is nice her cross-country skiing when there is snow.          Health Care Directive  Patient does not have a Health Care Directive: Discussed advance care planning with patient; information given to patient to review.      2/25/2025   General Health   How would you rate your overall physical health? Excellent   Feel stress (tense, anxious, or unable to sleep) Not at all         2/25/2025   Nutrition   Diet: Regular (no restrictions)         2/25/2025   Exercise   Days per week of moderate/strenous exercise 3 days   Average minutes spent exercising at this level 120 min         2/25/2025   Social Factors   Frequency of gathering with friends or relatives Patient declined   Worry food won't last until get money to buy more No   Food not last or not have enough money for food? No   Do you have housing? (Housing is defined as stable permanent housing and does not include staying ouside in a car, in a tent, in an abandoned building, in  an overnight shelter, or couch-surfing.) Yes   Are you worried about losing your housing? No   Lack of transportation? No   Unable to get utilities (heat,electricity)? No         2/25/2025   Fall Risk   Fallen 2 or more times in the past year? No   Trouble with walking or balance? No          2/25/2025   Activities of Daily Living- Home Safety   Needs help with the following daily activites None of the above   Safety concerns in the home No grab bars in the bathroom         2/25/2025   Dental   Dentist two times every year? Yes         2/25/2025   Hearing Screening   Hearing concerns? None of the above         2/25/2025   Driving Risk Screening   Patient/family members have concerns about driving No         2/25/2025   General Alertness/Fatigue Screening   Have you been more tired than usual lately? No         2/25/2025   Urinary Incontinence Screening   Bothered by leaking urine in past 6 months No         2/13/2024   TB Screening   Were you born outside of the [place]? No         Today's PHQ-2 Score:       2/25/2025     8:35 PM   PHQ-2 ( 1999 [employer])   Q1: Little interest or pleasure in doing things 0   Q2: Feeling down, depressed or hopeless 0   PHQ-2 Score 0    Q1: Little interest or pleasure in doing things Not at all   Q2: Feeling down, depressed or hopeless Not at all   PHQ-2 Score 0       Patient-reported           2/25/2025   Substance Use   Alcohol more than 3/day or more than 7/wk No   Do you have a current opioid prescription? No   How severe/bad is pain from 1 to 10? 0/10 (No Pain)   Do you use any other substances recreationally? No     Social History     Tobacco Use    Smoking status: Never     Passive exposure: Never    Smokeless tobacco: Never   Vaping Use    Vaping status: Never Used   Substance Use Topics    Alcohol use: Yes     Comment: minimal    Drug use: No           2/25/2025   AAA Screening   Family history of Abdominal Aortic Aneurysm (AAA)? No   Last PSA:   Prostate Specific Antigen Screen  "  Date Value Ref Range Status   02/14/2024 3.22 0.00 - 4.50 ng/mL Final   11/12/2021 2.78 0.00 - 4.50 ug/L Final     ASCVD Risk   The 10-year ASCVD risk score (Rd VENEGAS, et al., 2019) is: 16.2%    Values used to calculate the score:      Age: 69 years      Sex: Male      Is Non- : No      Diabetic: No      Tobacco smoker: No      Systolic Blood Pressure: 145 mmHg      Is BP treated: No      HDL Cholesterol: 45 mg/dL      Total Cholesterol: 134 mg/dL            Reviewed and updated as needed this visit by Provider                      Current providers sharing in care for this patient include:  Patient Care Team:  Ned Kwong MD as PCP - General (Family Medicine)  Berna Clark APRN CNP as Nurse Practitioner (Hematology & Oncology)  Doris Priest MD as MD (Hematology & Oncology)  Ned Kwong MD as Assigned PCP  Anastacio Ortez MD as MD (Dermatology)  Teodoro Bee MD as Assigned Surgical Provider    The following health maintenance items are reviewed in Epic and correct as of today:  Health Maintenance   Topic Date Due    ANNUAL REVIEW OF HM ORDERS  Never done    HEPATITIS C SCREENING  Never done    BMP  02/14/2025    LIPID  02/14/2025    MEDICARE ANNUAL WELLNESS VISIT  02/14/2025    COVID-19 Vaccine (8 - 2024-25 season) 04/23/2025    FALL RISK ASSESSMENT  02/26/2026    COLORECTAL CANCER SCREENING  11/14/2026    GLUCOSE  02/14/2027    ADVANCE CARE PLANNING  02/14/2029    RSV VACCINE (1 - 1-dose 75+ series) 06/11/2031    DTAP/TDAP/TD IMMUNIZATION (2 - Td or Tdap) 12/11/2034    PHQ-2 (once per calendar year)  Completed    INFLUENZA VACCINE  Completed    Pneumococcal Vaccine: 50+ Years  Completed    ZOSTER IMMUNIZATION  Completed    HPV IMMUNIZATION  Aged Out    MENINGITIS IMMUNIZATION  Aged Out            Objective    Exam  BP (!) 145/70 (BP Location: Left arm, Patient Position: Sitting, Cuff Size: Adult Regular)   Pulse 67   Resp 16   Ht 1.791 m (5' 10.5\")  " " Wt 90.4 kg (199 lb 6.4 oz)   SpO2 97%   BMI 28.21 kg/m     Estimated body mass index is 28.21 kg/m  as calculated from the following:    Height as of this encounter: 1.791 m (5' 10.5\").    Weight as of this encounter: 90.4 kg (199 lb 6.4 oz).    Physical Exam  GENERAL: alert and no distress  EYES: Eyes grossly normal to inspection, PERRL and conjunctivae and sclerae normal  HENT: ear canals and TM's normal, nose and mouth without ulcers or lesions  RESP: lungs clear to auscultation - no rales, rhonchi or wheezes  CV: regular rate and rhythm, normal S1 S2, no S3 or S4, no murmur, click or rub, no peripheral edema  ABDOMEN: bowel sounds normal  MS: no gross musculoskeletal defects noted, no edema  NEURO: Normal strength and tone, mentation intact and speech normal  PSYCH: mentation appears normal, affect normal/bright        2/26/2025   Mini Cog   Clock Draw Score 2 Normal   3 Item Recall 3 objects recalled   Mini Cog Total Score 5            The longitudinal plan of care for the diagnosis(es)/condition(s) as documented were addressed during this visit. Due to the added complexity in care, I will continue to support Bill in the subsequent management and with ongoing continuity of care.  Signed Electronically by: Ned Kwong MD    "

## 2025-03-11 ENCOUNTER — LAB (OUTPATIENT)
Dept: LAB | Facility: CLINIC | Age: 70
End: 2025-03-11
Payer: MEDICARE

## 2025-03-11 DIAGNOSIS — E78.2 MIXED HYPERLIPIDEMIA: ICD-10-CM

## 2025-03-11 DIAGNOSIS — Z00.00 HEALTH CARE MAINTENANCE: ICD-10-CM

## 2025-03-11 DIAGNOSIS — Z12.5 SCREENING FOR PROSTATE CANCER: ICD-10-CM

## 2025-03-11 LAB
ERYTHROCYTE [DISTWIDTH] IN BLOOD BY AUTOMATED COUNT: 12.7 % (ref 10–15)
HCT VFR BLD AUTO: 43.1 % (ref 40–53)
HGB BLD-MCNC: 14.2 G/DL (ref 13.3–17.7)
MCH RBC QN AUTO: 29.2 PG (ref 26.5–33)
MCHC RBC AUTO-ENTMCNC: 32.9 G/DL (ref 31.5–36.5)
MCV RBC AUTO: 89 FL (ref 78–100)
PLATELET # BLD AUTO: 168 10E3/UL (ref 150–450)
RBC # BLD AUTO: 4.86 10E6/UL (ref 4.4–5.9)
WBC # BLD AUTO: 5.5 10E3/UL (ref 4–11)

## 2025-03-11 PROCEDURE — 36415 COLL VENOUS BLD VENIPUNCTURE: CPT

## 2025-03-11 PROCEDURE — 80061 LIPID PANEL: CPT

## 2025-03-11 PROCEDURE — G0103 PSA SCREENING: HCPCS

## 2025-03-11 PROCEDURE — 85027 COMPLETE CBC AUTOMATED: CPT

## 2025-03-12 LAB
CHOLEST SERPL-MCNC: 146 MG/DL
FASTING STATUS PATIENT QL REPORTED: YES
HDLC SERPL-MCNC: 47 MG/DL
LDLC SERPL CALC-MCNC: 86 MG/DL
NONHDLC SERPL-MCNC: 99 MG/DL
PSA SERPL DL<=0.01 NG/ML-MCNC: 3.08 NG/ML (ref 0–4.5)
TRIGL SERPL-MCNC: 66 MG/DL

## 2025-03-13 LAB
ALBUMIN SERPL BCG-MCNC: 4.3 G/DL (ref 3.5–5.2)
ALP SERPL-CCNC: 72 U/L (ref 40–150)
ALT SERPL W P-5'-P-CCNC: 33 U/L (ref 0–70)
ANION GAP SERPL CALCULATED.3IONS-SCNC: 8 MMOL/L (ref 7–15)
AST SERPL W P-5'-P-CCNC: 25 U/L (ref 0–45)
BILIRUB SERPL-MCNC: 1 MG/DL
BUN SERPL-MCNC: 24.7 MG/DL (ref 8–23)
CALCIUM SERPL-MCNC: 9.5 MG/DL (ref 8.8–10.4)
CHLORIDE SERPL-SCNC: 106 MMOL/L (ref 98–107)
CREAT SERPL-MCNC: 0.85 MG/DL (ref 0.67–1.17)
EGFRCR SERPLBLD CKD-EPI 2021: >90 ML/MIN/1.73M2
FASTING STATUS PATIENT QL REPORTED: YES
GLUCOSE SERPL-MCNC: 100 MG/DL (ref 70–99)
HCO3 SERPL-SCNC: 28 MMOL/L (ref 22–29)
POTASSIUM SERPL-SCNC: 4.3 MMOL/L (ref 3.4–5.3)
PROT SERPL-MCNC: 6.7 G/DL (ref 6.4–8.3)
SODIUM SERPL-SCNC: 142 MMOL/L (ref 135–145)

## 2025-04-20 ENCOUNTER — HEALTH MAINTENANCE LETTER (OUTPATIENT)
Age: 70
End: 2025-04-20